# Patient Record
Sex: FEMALE | Race: WHITE | HISPANIC OR LATINO | URBAN - METROPOLITAN AREA
[De-identification: names, ages, dates, MRNs, and addresses within clinical notes are randomized per-mention and may not be internally consistent; named-entity substitution may affect disease eponyms.]

---

## 2021-01-05 ENCOUNTER — EMERGENCY (EMERGENCY)
Facility: HOSPITAL | Age: 21
LOS: 1 days | Discharge: HOME | End: 2021-01-05
Payer: COMMERCIAL

## 2021-01-05 VITALS
SYSTOLIC BLOOD PRESSURE: 134 MMHG | OXYGEN SATURATION: 100 % | RESPIRATION RATE: 18 BRPM | DIASTOLIC BLOOD PRESSURE: 90 MMHG | HEART RATE: 88 BPM | TEMPERATURE: 98 F

## 2021-01-05 PROCEDURE — U0005: CPT

## 2021-01-05 PROCEDURE — U0003: CPT

## 2021-01-05 PROCEDURE — 99283 EMERGENCY DEPT VISIT LOW MDM: CPT

## 2021-01-05 NOTE — ED PROVIDER NOTE - PATIENT PORTAL LINK FT
You can access the FollowMyHealth Patient Portal offered by [facility] by registering at the following website: [email or web address]. By joining [email or web address]’s FollowMyHealth portal, you will also be able to view your health information using other applications (apps) compatible with our system.

## 2021-01-05 NOTE — ED PROVIDER NOTE - OBJECTIVE STATEMENT
COVID ASYMPTOMATIC SWAB  Pt presenting to the ER for COVID-19 testing. Denies fevers chills, loss of taste or smell, URI symptoms, chest pain or shortness of breath, nausea vomiting diarrhea abdominal pain, weakness or fatigue. Eating and drinking normal diet. Normal output. Pt requesting testing at this time. [x] known exposure [] no-known exposure [] travel [x] no travel [ ] smoker [ x] non-smoker

## 2021-01-05 NOTE — ED PROVIDER NOTE - PHYSICAL EXAMINATION
Constitutional - well-developed; well nourished.  Head - NCAT. Airway patent.   Neuro - A&Ox3. normal gait.   Skin - No rash.   MSK - normal ROM.

## 2021-01-05 NOTE — ED PROVIDER NOTE - CLINICAL SUMMARY MEDICAL DECISION MAKING FREE TEXT BOX
Pt nontoxic appearing, stable vitals, ambulatory with stable saturation without supplemental oxygen. PT does not meet criteria listed in most updated guidelines as per [facility] protocol/algorithm for admission at this time. pt advised about self-quarantine instructions until negative test results and/or symptom resolution. pt advised on hand hygiene, monitoring of symptoms, antipyretic use as well as and fu with primary care provider. Instructions given in pre-printed copy.

## 2021-01-06 LAB — SARS-COV-2 RNA SPEC QL NAA+PROBE: SIGNIFICANT CHANGE UP

## 2021-09-02 ENCOUNTER — NON-APPOINTMENT (OUTPATIENT)
Age: 21
End: 2021-09-02

## 2021-09-02 ENCOUNTER — APPOINTMENT (OUTPATIENT)
Dept: ANTEPARTUM | Facility: CLINIC | Age: 21
End: 2021-09-02
Payer: COMMERCIAL

## 2021-09-02 ENCOUNTER — ASOB RESULT (OUTPATIENT)
Age: 21
End: 2021-09-02

## 2021-09-02 PROCEDURE — 76801 OB US < 14 WKS SINGLE FETUS: CPT

## 2021-09-02 PROCEDURE — 76813 OB US NUCHAL MEAS 1 GEST: CPT | Mod: 59

## 2021-09-02 PROCEDURE — 36416 COLLJ CAPILLARY BLOOD SPEC: CPT

## 2021-10-19 ENCOUNTER — APPOINTMENT (OUTPATIENT)
Dept: MATERNAL FETAL MEDICINE | Facility: CLINIC | Age: 21
End: 2021-10-19
Payer: COMMERCIAL

## 2021-10-19 ENCOUNTER — ASOB RESULT (OUTPATIENT)
Age: 21
End: 2021-10-19

## 2021-10-19 ENCOUNTER — APPOINTMENT (OUTPATIENT)
Dept: MATERNAL FETAL MEDICINE | Facility: CLINIC | Age: 21
End: 2021-10-19

## 2021-10-19 VITALS — WEIGHT: 292 LBS | HEIGHT: 66 IN | BODY MASS INDEX: 46.93 KG/M2

## 2021-10-19 PROCEDURE — 97802 MEDICAL NUTRITION INDIV IN: CPT

## 2021-10-28 ENCOUNTER — ASOB RESULT (OUTPATIENT)
Age: 21
End: 2021-10-28

## 2021-10-28 ENCOUNTER — APPOINTMENT (OUTPATIENT)
Dept: ANTEPARTUM | Facility: CLINIC | Age: 21
End: 2021-10-28
Payer: COMMERCIAL

## 2021-10-28 ENCOUNTER — APPOINTMENT (OUTPATIENT)
Dept: MATERNAL FETAL MEDICINE | Facility: CLINIC | Age: 21
End: 2021-10-28

## 2021-10-28 PROCEDURE — 36415 COLL VENOUS BLD VENIPUNCTURE: CPT

## 2021-10-28 PROCEDURE — 76811 OB US DETAILED SNGL FETUS: CPT

## 2021-11-01 LAB
2ND TRIMESTER DATA: NORMAL
AFP PNL SERPL: NORMAL
AFP SERPL-ACNC: NORMAL
CLINICAL BIOCHEMIST REVIEW: NORMAL
NOTES NTD: NORMAL

## 2021-11-02 ENCOUNTER — APPOINTMENT (OUTPATIENT)
Dept: MATERNAL FETAL MEDICINE | Facility: CLINIC | Age: 21
End: 2021-11-02

## 2021-11-05 ENCOUNTER — APPOINTMENT (OUTPATIENT)
Dept: ANTEPARTUM | Facility: CLINIC | Age: 21
End: 2021-11-05
Payer: COMMERCIAL

## 2021-11-05 ENCOUNTER — ASOB RESULT (OUTPATIENT)
Age: 21
End: 2021-11-05

## 2021-11-05 PROCEDURE — 76816 OB US FOLLOW-UP PER FETUS: CPT

## 2021-11-09 ENCOUNTER — APPOINTMENT (OUTPATIENT)
Dept: MATERNAL FETAL MEDICINE | Facility: CLINIC | Age: 21
End: 2021-11-09

## 2021-12-17 ENCOUNTER — APPOINTMENT (OUTPATIENT)
Dept: ANTEPARTUM | Facility: CLINIC | Age: 21
End: 2021-12-17
Payer: COMMERCIAL

## 2021-12-17 ENCOUNTER — ASOB RESULT (OUTPATIENT)
Age: 21
End: 2021-12-17

## 2021-12-17 PROCEDURE — 76816 OB US FOLLOW-UP PER FETUS: CPT

## 2022-01-28 ENCOUNTER — APPOINTMENT (OUTPATIENT)
Dept: ANTEPARTUM | Facility: CLINIC | Age: 22
End: 2022-01-28
Payer: COMMERCIAL

## 2022-01-28 ENCOUNTER — ASOB RESULT (OUTPATIENT)
Age: 22
End: 2022-01-28

## 2022-01-28 PROCEDURE — 76819 FETAL BIOPHYS PROFIL W/O NST: CPT

## 2022-01-28 PROCEDURE — 76816 OB US FOLLOW-UP PER FETUS: CPT

## 2022-02-11 ENCOUNTER — APPOINTMENT (OUTPATIENT)
Dept: ANTEPARTUM | Facility: CLINIC | Age: 22
End: 2022-02-11
Payer: COMMERCIAL

## 2022-02-11 ENCOUNTER — ASOB RESULT (OUTPATIENT)
Age: 22
End: 2022-02-11

## 2022-02-11 PROCEDURE — ZZZZZ: CPT

## 2022-02-11 PROCEDURE — 76818 FETAL BIOPHYS PROFILE W/NST: CPT

## 2022-02-18 ENCOUNTER — ASOB RESULT (OUTPATIENT)
Age: 22
End: 2022-02-18

## 2022-02-18 ENCOUNTER — APPOINTMENT (OUTPATIENT)
Dept: ANTEPARTUM | Facility: CLINIC | Age: 22
End: 2022-02-18
Payer: COMMERCIAL

## 2022-02-18 PROCEDURE — 76818 FETAL BIOPHYS PROFILE W/NST: CPT

## 2022-02-18 PROCEDURE — ZZZZZ: CPT

## 2022-02-25 ENCOUNTER — APPOINTMENT (OUTPATIENT)
Dept: ANTEPARTUM | Facility: CLINIC | Age: 22
End: 2022-02-25
Payer: COMMERCIAL

## 2022-02-25 ENCOUNTER — ASOB RESULT (OUTPATIENT)
Age: 22
End: 2022-02-25

## 2022-02-25 PROCEDURE — ZZZZZ: CPT

## 2022-02-25 PROCEDURE — 76816 OB US FOLLOW-UP PER FETUS: CPT

## 2022-02-25 PROCEDURE — 76818 FETAL BIOPHYS PROFILE W/NST: CPT

## 2022-03-04 ENCOUNTER — APPOINTMENT (OUTPATIENT)
Dept: ANTEPARTUM | Facility: CLINIC | Age: 22
End: 2022-03-04
Payer: COMMERCIAL

## 2022-03-04 ENCOUNTER — ASOB RESULT (OUTPATIENT)
Age: 22
End: 2022-03-04

## 2022-03-04 PROCEDURE — 76818 FETAL BIOPHYS PROFILE W/NST: CPT

## 2022-03-04 PROCEDURE — ZZZZZ: CPT

## 2022-03-07 ENCOUNTER — TRANSCRIPTION ENCOUNTER (OUTPATIENT)
Age: 22
End: 2022-03-07

## 2022-03-07 ENCOUNTER — OUTPATIENT (OUTPATIENT)
Dept: INPATIENT UNIT | Facility: HOSPITAL | Age: 22
LOS: 1 days | End: 2022-03-07
Payer: COMMERCIAL

## 2022-03-07 ENCOUNTER — OUTPATIENT (OUTPATIENT)
Dept: OUTPATIENT SERVICES | Facility: HOSPITAL | Age: 22
LOS: 1 days | End: 2022-03-07

## 2022-03-07 VITALS
RESPIRATION RATE: 16 BRPM | SYSTOLIC BLOOD PRESSURE: 118 MMHG | DIASTOLIC BLOOD PRESSURE: 60 MMHG | TEMPERATURE: 98 F | HEART RATE: 88 BPM | HEIGHT: 67 IN | WEIGHT: 293 LBS

## 2022-03-07 VITALS
RESPIRATION RATE: 16 BRPM | TEMPERATURE: 98 F | SYSTOLIC BLOOD PRESSURE: 118 MMHG | DIASTOLIC BLOOD PRESSURE: 60 MMHG | HEART RATE: 88 BPM

## 2022-03-07 DIAGNOSIS — O47.1 FALSE LABOR AT OR AFTER 37 COMPLETED WEEKS OF GESTATION: ICD-10-CM

## 2022-03-07 DIAGNOSIS — Z01.818 ENCOUNTER FOR OTHER PREPROCEDURAL EXAMINATION: ICD-10-CM

## 2022-03-07 LAB
ALBUMIN SERPL ELPH-MCNC: 3.5 G/DL — SIGNIFICANT CHANGE UP (ref 3.3–5.2)
ALP SERPL-CCNC: 161 U/L — HIGH (ref 40–120)
ALT FLD-CCNC: 12 U/L — SIGNIFICANT CHANGE UP
ANION GAP SERPL CALC-SCNC: 12 MMOL/L — SIGNIFICANT CHANGE UP (ref 5–17)
APPEARANCE UR: ABNORMAL
AST SERPL-CCNC: 17 U/L — SIGNIFICANT CHANGE UP
BILIRUB SERPL-MCNC: 0.2 MG/DL — LOW (ref 0.4–2)
BILIRUB UR-MCNC: NEGATIVE — SIGNIFICANT CHANGE UP
BLD GP AB SCN SERPL QL: SIGNIFICANT CHANGE UP
BUN SERPL-MCNC: 8.7 MG/DL — SIGNIFICANT CHANGE UP (ref 8–20)
CALCIUM SERPL-MCNC: 9.5 MG/DL — SIGNIFICANT CHANGE UP (ref 8.6–10.2)
CHLORIDE SERPL-SCNC: 104 MMOL/L — SIGNIFICANT CHANGE UP (ref 98–107)
CO2 SERPL-SCNC: 22 MMOL/L — SIGNIFICANT CHANGE UP (ref 22–29)
COLOR SPEC: YELLOW — SIGNIFICANT CHANGE UP
CREAT SERPL-MCNC: 0.49 MG/DL — LOW (ref 0.5–1.3)
DIFF PNL FLD: NEGATIVE — SIGNIFICANT CHANGE UP
EGFR: 136 ML/MIN/1.73M2 — SIGNIFICANT CHANGE UP
EPI CELLS # UR: ABNORMAL
GLUCOSE SERPL-MCNC: 92 MG/DL — SIGNIFICANT CHANGE UP (ref 70–99)
GLUCOSE UR QL: NEGATIVE MG/DL — SIGNIFICANT CHANGE UP
HCT VFR BLD CALC: 38.6 % — SIGNIFICANT CHANGE UP (ref 34.5–45)
HGB BLD-MCNC: 12.9 G/DL — SIGNIFICANT CHANGE UP (ref 11.5–15.5)
HIV 1 & 2 AB SERPL IA.RAPID: SIGNIFICANT CHANGE UP
KETONES UR-MCNC: ABNORMAL
LEUKOCYTE ESTERASE UR-ACNC: ABNORMAL
MCHC RBC-ENTMCNC: 28.9 PG — SIGNIFICANT CHANGE UP (ref 27–34)
MCHC RBC-ENTMCNC: 33.4 GM/DL — SIGNIFICANT CHANGE UP (ref 32–36)
MCV RBC AUTO: 86.4 FL — SIGNIFICANT CHANGE UP (ref 80–100)
NITRITE UR-MCNC: NEGATIVE — SIGNIFICANT CHANGE UP
PH UR: 6 — SIGNIFICANT CHANGE UP (ref 5–8)
PLATELET # BLD AUTO: 280 K/UL — SIGNIFICANT CHANGE UP (ref 150–400)
POTASSIUM SERPL-MCNC: 4.3 MMOL/L — SIGNIFICANT CHANGE UP (ref 3.5–5.3)
POTASSIUM SERPL-SCNC: 4.3 MMOL/L — SIGNIFICANT CHANGE UP (ref 3.5–5.3)
PROT SERPL-MCNC: 7.6 G/DL — SIGNIFICANT CHANGE UP (ref 6.6–8.7)
PROT UR-MCNC: 15
RBC # BLD: 4.47 M/UL — SIGNIFICANT CHANGE UP (ref 3.8–5.2)
RBC # FLD: 14.1 % — SIGNIFICANT CHANGE UP (ref 10.3–14.5)
RBC CASTS # UR COMP ASSIST: NEGATIVE /HPF — SIGNIFICANT CHANGE UP (ref 0–4)
SODIUM SERPL-SCNC: 138 MMOL/L — SIGNIFICANT CHANGE UP (ref 135–145)
SP GR SPEC: 1.02 — SIGNIFICANT CHANGE UP (ref 1.01–1.02)
UROBILINOGEN FLD QL: 1 MG/DL
WBC # BLD: 12.78 K/UL — HIGH (ref 3.8–10.5)
WBC # FLD AUTO: 12.78 K/UL — HIGH (ref 3.8–10.5)
WBC UR QL: SIGNIFICANT CHANGE UP /HPF (ref 0–5)

## 2022-03-07 PROCEDURE — 81001 URINALYSIS AUTO W/SCOPE: CPT

## 2022-03-07 PROCEDURE — 85027 COMPLETE CBC AUTOMATED: CPT

## 2022-03-07 PROCEDURE — 86703 HIV-1/HIV-2 1 RESULT ANTBDY: CPT

## 2022-03-07 PROCEDURE — 86900 BLOOD TYPING SEROLOGIC ABO: CPT

## 2022-03-07 PROCEDURE — 80053 COMPREHEN METABOLIC PANEL: CPT

## 2022-03-07 PROCEDURE — 86850 RBC ANTIBODY SCREEN: CPT

## 2022-03-07 PROCEDURE — 87389 HIV-1 AG W/HIV-1&-2 AB AG IA: CPT

## 2022-03-07 PROCEDURE — 36415 COLL VENOUS BLD VENIPUNCTURE: CPT

## 2022-03-07 PROCEDURE — 86901 BLOOD TYPING SEROLOGIC RH(D): CPT

## 2022-03-07 NOTE — OB PROVIDER TRIAGE NOTE - NSOBPROVIDERNOTE_OBGYN_ALL_OB_FT
A/P: Patient is a 22yo  at 39w6d here for presurgical labs for pCS tomorrow for active herpes lesion  - Vital signs wnl  - Tracing reactive with no signs of labor  - Preoperative blood work drawn  - COVID swab not done as pt COVID+ in January  - Okay for d/c home, return in AM for CS    Plan D/w Dr. Ward

## 2022-03-07 NOTE — OB RN TRIAGE NOTE - NS_TRIAGEPROVIDERNOTIFIEDBY_OBGYN_ALL_OB_FT
Patient would like all of his prescriptions sent to [facility] [address], [address], [place] [address]   Sara RN

## 2022-03-07 NOTE — OB PROVIDER TRIAGE NOTE - NSHPPHYSICALEXAM_GEN_ALL_CORE
Vital Signs Last 24 Hrs  T(C): 36.9 (07 Mar 2022 20:28), Max: 36.9 (07 Mar 2022 20:28)  T(F): 98.4 (07 Mar 2022 20:28), Max: 98.4 (07 Mar 2022 20:28)  HR: 88 (07 Mar 2022 20:28) (88 - 88)  BP: 118/60 (07 Mar 2022 20:28) (118/60 - 118/60)  BP(mean): --  RR: 16 (07 Mar 2022 20:28) (16 - 16)  SpO2: --    Physical Exam  General: Alert and oriented x3, NAD  Heart: RRR  Lungs: CTAB  Abd: Soft, nontender, gravid  FHT: 135bpm - reactive  [place]: No ctxs

## 2022-03-07 NOTE — OB RN TRIAGE NOTE - NSICDXPASTMEDICALHX_GEN_ALL_CORE_FT
PAST MEDICAL HISTORY:  COVID-19 (Covid positive 1/2/2022)    HSV-2 (herpes simplex virus 2) infection

## 2022-03-07 NOTE — OB RN TRIAGE NOTE - FALL HARM RISK - UNIVERSAL INTERVENTIONS
Bed in lowest position, wheels locked, appropriate side rails in place/Call bell, personal items and telephone in reach/Instruct patient to call for assistance before getting out of bed or chair/Non-slip footwear when patient is out of bed/[place] to call system/Physically safe environment - no spills, clutter or unnecessary equipment/Purposeful Proactive Rounding/Room/bathroom lighting operational, light cord in reach

## 2022-03-07 NOTE — OB PROVIDER TRIAGE NOTE - HISTORY OF PRESENT ILLNESS
Patient is a 20yo  at 39w6d presenting for PST for pCS for active herpes lesion tomorrow.  She denies any OB sxs today, no VB, LOF, CTXs. +FM    Prenatal course c/b HSV2    Obhx: none  Medhx: HSV2, obesity (BMI 47)  Surghx: none  Meds: Valtrex  All: NKDA

## 2022-03-08 ENCOUNTER — TRANSCRIPTION ENCOUNTER (OUTPATIENT)
Age: 22
End: 2022-03-08

## 2022-03-08 ENCOUNTER — RESULT REVIEW (OUTPATIENT)
Age: 22
End: 2022-03-08

## 2022-03-08 ENCOUNTER — INPATIENT (INPATIENT)
Facility: HOSPITAL | Age: 22
LOS: 2 days | Discharge: HOME | End: 2022-03-11
Attending: OBSTETRICS & GYNECOLOGY | Admitting: OBSTETRICS & GYNECOLOGY
Payer: COMMERCIAL

## 2022-03-08 VITALS
RESPIRATION RATE: 18 BRPM | WEIGHT: 293 LBS | HEIGHT: 67 IN | TEMPERATURE: 98 F | SYSTOLIC BLOOD PRESSURE: 142 MMHG | HEART RATE: 103 BPM | DIASTOLIC BLOOD PRESSURE: 82 MMHG

## 2022-03-08 LAB
BLD GP AB SCN SERPL QL: SIGNIFICANT CHANGE UP
COVID-19 SPIKE DOMAIN AB INTERP: POSITIVE
COVID-19 SPIKE DOMAIN ANTIBODY RESULT: >250 U/ML — HIGH
HIV 1+2 AB+HIV1 P24 AG SERPL QL IA: SIGNIFICANT CHANGE UP
SARS-COV-2 IGG+IGM SERPL QL IA: >250 U/ML — HIGH
SARS-COV-2 IGG+IGM SERPL QL IA: POSITIVE

## 2022-03-08 PROCEDURE — 88307 TISSUE EXAM BY PATHOLOGIST: CPT | Mod: 26

## 2022-03-08 RX ORDER — CEFAZOLIN SODIUM 1 G
300 VIAL (EA) INJECTION EVERY 8 HOURS
Refills: 0 | Status: DISCONTINUED | OUTPATIENT
Start: 2022-03-08 | End: 2022-03-08

## 2022-03-08 RX ORDER — TETANUS TOXOID, REDUCED DIPHTHERIA TOXOID AND ACELLULAR PERTUSSIS VACCINE, ADSORBED 5; 2.5; 8; 8; 2.5 [IU]/.5ML; [IU]/.5ML; UG/.5ML; UG/.5ML; UG/.5ML
0.5 SUSPENSION INTRAMUSCULAR ONCE
Refills: 0 | Status: DISCONTINUED | OUTPATIENT
Start: 2022-03-08 | End: 2022-03-11

## 2022-03-08 RX ORDER — DIPHENHYDRAMINE HCL 50 MG
25 CAPSULE ORAL EVERY 6 HOURS
Refills: 0 | Status: DISCONTINUED | OUTPATIENT
Start: 2022-03-08 | End: 2022-03-11

## 2022-03-08 RX ORDER — CITRIC ACID/SODIUM CITRATE 300-500 MG
30 SOLUTION, ORAL ORAL ONCE
Refills: 0 | Status: DISCONTINUED | OUTPATIENT
Start: 2022-03-08 | End: 2022-03-08

## 2022-03-08 RX ORDER — DIPHENHYDRAMINE HCL 50 MG
25 CAPSULE ORAL EVERY 4 HOURS
Refills: 0 | Status: DISCONTINUED | OUTPATIENT
Start: 2022-03-08 | End: 2022-03-11

## 2022-03-08 RX ORDER — OXYCODONE HYDROCHLORIDE 5 MG/1
5 TABLET ORAL
Refills: 0 | Status: COMPLETED | OUTPATIENT
Start: 2022-03-08 | End: 2022-03-15

## 2022-03-08 RX ORDER — INFLUENZA VIRUS VACCINE 15; 15; 15; 15 UG/.5ML; UG/.5ML; UG/.5ML; UG/.5ML
0.5 SUSPENSION INTRAMUSCULAR ONCE
Refills: 0 | Status: COMPLETED | OUTPATIENT
Start: 2022-03-08 | End: 2022-03-08

## 2022-03-08 RX ORDER — CEFAZOLIN SODIUM 1 G
2000 VIAL (EA) INJECTION ONCE
Refills: 0 | Status: DISCONTINUED | OUTPATIENT
Start: 2022-03-08 | End: 2022-03-08

## 2022-03-08 RX ORDER — ENOXAPARIN SODIUM 100 MG/ML
80 INJECTION SUBCUTANEOUS EVERY 24 HOURS
Refills: 0 | Status: DISCONTINUED | OUTPATIENT
Start: 2022-03-08 | End: 2022-03-11

## 2022-03-08 RX ORDER — SODIUM CHLORIDE 9 MG/ML
1000 INJECTION, SOLUTION INTRAVENOUS ONCE
Refills: 0 | Status: COMPLETED | OUTPATIENT
Start: 2022-03-08 | End: 2022-03-08

## 2022-03-08 RX ORDER — MAGNESIUM HYDROXIDE 400 MG/1
30 TABLET, CHEWABLE ORAL
Refills: 0 | Status: DISCONTINUED | OUTPATIENT
Start: 2022-03-08 | End: 2022-03-11

## 2022-03-08 RX ORDER — CEFAZOLIN SODIUM 1 G
3000 VIAL (EA) INJECTION ONCE
Refills: 0 | Status: COMPLETED | OUTPATIENT
Start: 2022-03-08 | End: 2022-03-08

## 2022-03-08 RX ORDER — ONDANSETRON 8 MG/1
4 TABLET, FILM COATED ORAL EVERY 6 HOURS
Refills: 0 | Status: DISCONTINUED | OUTPATIENT
Start: 2022-03-08 | End: 2022-03-11

## 2022-03-08 RX ORDER — IBUPROFEN 200 MG
600 TABLET ORAL EVERY 6 HOURS
Refills: 0 | Status: COMPLETED | OUTPATIENT
Start: 2022-03-08 | End: 2023-02-04

## 2022-03-08 RX ORDER — FAMOTIDINE 10 MG/ML
20 INJECTION INTRAVENOUS ONCE
Refills: 0 | Status: COMPLETED | OUTPATIENT
Start: 2022-03-08 | End: 2022-03-08

## 2022-03-08 RX ORDER — ACETAMINOPHEN 500 MG
975 TABLET ORAL
Refills: 0 | Status: DISCONTINUED | OUTPATIENT
Start: 2022-03-08 | End: 2022-03-11

## 2022-03-08 RX ORDER — NALOXONE HYDROCHLORIDE 4 MG/.1ML
0.1 SPRAY NASAL
Refills: 0 | Status: DISCONTINUED | OUTPATIENT
Start: 2022-03-08 | End: 2022-03-11

## 2022-03-08 RX ORDER — SODIUM CHLORIDE 9 MG/ML
1000 INJECTION, SOLUTION INTRAVENOUS
Refills: 0 | Status: DISCONTINUED | OUTPATIENT
Start: 2022-03-08 | End: 2022-03-11

## 2022-03-08 RX ORDER — SODIUM CHLORIDE 9 MG/ML
1000 INJECTION, SOLUTION INTRAVENOUS
Refills: 0 | Status: DISCONTINUED | OUTPATIENT
Start: 2022-03-08 | End: 2022-03-08

## 2022-03-08 RX ORDER — LANOLIN
1 OINTMENT (GRAM) TOPICAL EVERY 6 HOURS
Refills: 0 | Status: DISCONTINUED | OUTPATIENT
Start: 2022-03-08 | End: 2022-03-11

## 2022-03-08 RX ORDER — OXYTOCIN 10 UNIT/ML
333.33 VIAL (ML) INJECTION
Qty: 20 | Refills: 0 | Status: DISCONTINUED | OUTPATIENT
Start: 2022-03-08 | End: 2022-03-11

## 2022-03-08 RX ORDER — ACETAMINOPHEN 500 MG
1000 TABLET ORAL ONCE
Refills: 0 | Status: COMPLETED | OUTPATIENT
Start: 2022-03-08 | End: 2022-03-08

## 2022-03-08 RX ORDER — KETOROLAC TROMETHAMINE 30 MG/ML
30 SYRINGE (ML) INJECTION EVERY 6 HOURS
Refills: 0 | Status: DISCONTINUED | OUTPATIENT
Start: 2022-03-08 | End: 2022-03-09

## 2022-03-08 RX ORDER — SIMETHICONE 80 MG/1
80 TABLET, CHEWABLE ORAL EVERY 4 HOURS
Refills: 0 | Status: DISCONTINUED | OUTPATIENT
Start: 2022-03-08 | End: 2022-03-11

## 2022-03-08 RX ORDER — CEFAZOLIN SODIUM 1 G
3000 VIAL (EA) INJECTION EVERY 8 HOURS
Refills: 0 | Status: COMPLETED | OUTPATIENT
Start: 2022-03-08 | End: 2022-03-09

## 2022-03-08 RX ORDER — OXYCODONE HYDROCHLORIDE 5 MG/1
5 TABLET ORAL ONCE
Refills: 0 | Status: DISCONTINUED | OUTPATIENT
Start: 2022-03-08 | End: 2022-03-11

## 2022-03-08 RX ADMIN — Medication 100 MILLIGRAM(S): at 17:42

## 2022-03-08 RX ADMIN — FAMOTIDINE 20 MILLIGRAM(S): 10 INJECTION INTRAVENOUS at 09:27

## 2022-03-08 RX ADMIN — Medication 400 MILLIGRAM(S): at 13:43

## 2022-03-08 RX ADMIN — Medication 200 MILLIGRAM(S): at 10:57

## 2022-03-08 RX ADMIN — Medication 30 MILLIGRAM(S): at 23:18

## 2022-03-08 RX ADMIN — SODIUM CHLORIDE 125 MILLILITER(S): 9 INJECTION, SOLUTION INTRAVENOUS at 09:45

## 2022-03-08 RX ADMIN — ENOXAPARIN SODIUM 80 MILLIGRAM(S): 100 INJECTION SUBCUTANEOUS at 23:48

## 2022-03-08 RX ADMIN — Medication 30 MILLIGRAM(S): at 17:42

## 2022-03-08 RX ADMIN — Medication 1000 MILLIGRAM(S): at 14:14

## 2022-03-08 RX ADMIN — Medication 1000 MILLIUNIT(S)/MIN: at 10:58

## 2022-03-08 RX ADMIN — SODIUM CHLORIDE 2000 MILLILITER(S): 9 INJECTION, SOLUTION INTRAVENOUS at 09:00

## 2022-03-08 RX ADMIN — Medication 975 MILLIGRAM(S): at 20:12

## 2022-03-08 NOTE — OB RN PATIENT PROFILE - FUNCTIONAL ASSESSMENT - BASIC MOBILITY SCORE.
Pt presents with mid abdominal pain, RUQ pain since Sunday, vomiting since 1:30 am, pt assessed by Dr Huber, pt agrees to ER transfer, report given to RN, pt taken to ER via wheelchair.    24

## 2022-03-08 NOTE — DISCHARGE NOTE OB - MEDICATION SUMMARY - MEDICATIONS TO TAKE
I will START or STAY ON the medications listed below when I get home from the hospital:    acetaminophen 325 mg oral capsule  -- 3 cap(s) by mouth every 6 hours   -- Indication: For pain    ibuprofen 600 mg oral tablet  -- 1 tab(s) by mouth every 6 hours   -- Do not take this drug if you are pregnant.  It is very important that you take or use this exactly as directed.  Do not skip doses or discontinue unless directed by your doctor.  May cause drowsiness or dizziness.  Obtain medical advice before taking any non-prescription drugs as some may affect the action of this medication.  Take with food or milk.    -- Indication: For pain    Lovenox 60 mg/0.6 mL injectable solution  -- 60 milligram(s) subcutaneously once a day   -- It is very important that you take or use this exactly as directed.  Do not skip doses or discontinue unless directed by your doctor.    -- Indication: For VTX ppx

## 2022-03-08 NOTE — OB PROVIDER H&P - ASSESSMENT
Patient is a 20 year old  at 39w6d who presents to L&D for pCS 2/2 active genital herpetic lesion. Patient is a 20 year old  at 39w6d who presents to L&D for pCS 2/2 active genital herpetic lesion.    A/P:   -Admit to L&D  -Consent  -Admission labs  -3g ancef preop  -IV fluids  -Fetus: Cat 1 tracing. Continuous toco and fetal monitoring.   -GBS: no GBS ppx required   -Analgesia: spinal    Discussed with Dr. Ward

## 2022-03-08 NOTE — OB PROVIDER H&P - HISTORY OF PRESENT ILLNESS
Patient is a 20 year old  at 39w6d who presents to L&D for pCS 2/2 active genital herpetic lesion.    NEY: 3/8/22   LMP: 21     Pregnancy course:   1. HSV2, on valtrex   2. Covid +, asymptomatic      Obhx: denies   Gynhx: HSV2+, denies hx of ovarian cysts, fibroids, abnormal paps   Pmhx: denies   Pshx: denies   Meds: PNV, valtrex   Allergies: NKDA   Social Hx: denies EtOH, tobacco and illicit drug use in pregnancy  Patient is a 20 year old  at 39w6d who presents to L&D for pCS 2/2 active genital herpetic lesion. - ctx, - vaginal bleeding, - loss of fluid. + fetal movement. No complaints.     NEY: 3/8/22   LMP: 21     Pregnancy course:   1. HSV2, on valtrex   2. Covid +, asymptomatic      Obhx: denies   Gynhx: HSV2+, denies hx of ovarian cysts, fibroids, abnormal paps   Pmhx: denies   Pshx: denies   Meds: PNV, valtrex   Allergies: NKDA   Social Hx: denies EtOH, tobacco and illicit drug use in pregnancy

## 2022-03-08 NOTE — DISCHARGE NOTE OB - PATIENT PORTAL LINK FT
You can access the FollowMyHealth Patient Portal offered by [facility] by registering at the following website: [email or web address]. By joining [employer]’s FollowMyHealth portal, you will also be able to view your health information using other applications (apps) compatible with our system.

## 2022-03-08 NOTE — DISCHARGE NOTE OB - CARE PLAN
1 Principal Discharge DX:	Delivery normal  Assessment and plan of treatment:	1) Please take ibuprofen and/or Tylenol as needed for pain as prescribed.  2) Nothing in the vagina for 6 weeks (including no sex, no tampons, and no douching).  3) Please call your doctor for a follow up your postpartum appointment in 1 week.  4) Please continue taking vitamins postpartum. Take iron and colace for acute blood loss anemia.  5) Please call the office sooner if you have heavy vaginal bleeding, severe abdominal pain, or fever > 100.4F.  6) You may resume regular daily activity as tolerated

## 2022-03-08 NOTE — OB RN DELIVERY SUMMARY - NS_SEPSISRSKCALC_OBGYN_ALL_OB_FT
No temperature has been documented for this patient in CPN or on the OB Flowsheet. Ensure the highest temperature during labor was documented on the OB Flowsheet.  No gestational age at birth has been documented. Ensure delivery date/time has been entered above.  Rupture of membranes must be entered above.   EOS calculated successfully. EOS Risk Factor: 0.5/1000 live births ([facility] national incidence); GA=40w;Temp=98.06; ROM=0.05; GBS='Positive'; Antibiotics='No antibiotics or any antibiotics < 2 hrs prior to birth'

## 2022-03-08 NOTE — OB RN TRIAGE NOTE - FALL HARM RISK - UNIVERSAL INTERVENTIONS
Bed in lowest position, wheels locked, appropriate side rails in place/Call bell, personal items and telephone in reach/Instruct patient to call for assistance before getting out of bed or chair/Non-slip footwear when patient is out of bed/[place] to call system/Physically safe environment - no spills, clutter or unnecessary equipment/Purposeful Proactive Rounding/Room/bathroom lighting operational, light cord in reach

## 2022-03-08 NOTE — OB PROVIDER H&P - NSHPPHYSICALEXAM_GEN_ALL_CORE
Vitals:  Vital Signs Last 24 Hrs  T(C): 36.7 (08 Mar 2022 09:14), Max: 36.9 (07 Mar 2022 20:28)  T(F): 98.06 (08 Mar 2022 09:14), Max: 98.4 (07 Mar 2022 20:28)  HR: 85 (08 Mar 2022 09:52) (85 - 103)  BP: 135/68 (08 Mar 2022 09:52) (118/60 - 142/82)  RR: 18 (08 Mar 2022 08:55) (16 - 18)    Gen: well-appearing, NAD   Resp: breathing comfortably on RA   Abd: nontender, gravid   VE: deferred     Bedside sono: cephalic  FHT: reactive  [place]: no reg ctx

## 2022-03-08 NOTE — OB NEONATOLOGY/PEDIATRICIAN DELIVERY SUMMARY - NSPEDSNEONOTESA_OBGYN_ALL_OB_FT
Called to attend this vacuum assisted primary, scheduled  at 40 weeks. Mother is a 22yo  with hx of HSV2 on valtrex, prenatal labs negative, GBS + (no labor or rupture), blood type O+. Baby emerged with good tone, cry with stimulations, and copious secretions. She was warmed, dried and stimulated and developed grunting respirations for which she received CPAP 5 21% for 5 minutes. She continued to have deep retractions and grunting and decision was made to admit to NICU for respiratory distress.

## 2022-03-08 NOTE — DISCHARGE NOTE OB - CARE PROVIDER_API CALL
Kim Ward)  Obstetrics and Gynecology  [address] [place] [address]  Phone: [phone]  Fax: [phone]  Follow Up Time:

## 2022-03-08 NOTE — DISCHARGE NOTE OB - NS MD DC FALL RISK RISK
For information on Fall & Injury Prevention, visit: [email or web address] OR  [email or web address] OR  https://www.cdc.gov/steadi/patient.html

## 2022-03-08 NOTE — OB PROVIDER DELIVERY SUMMARY - NSPROVIDERDELIVERYNOTE_OBGYN_ALL_OB_FT
Pt taken to the OR for [facility] primary C/S due to active HSV.  Not in labor.  Spinal anesthesia.  Low transverse  section was performed.  There was difficulty delivering the fetal head. Additional assistance required and provided. Vacuum was applied to fetal head.  Delivered live female infant, vtx, through moderate amount of clear amniotic fluid.  No nuchal cord.  Uterus, tubes, ovaries WNL.   Hysterotomy was reapproximated with suture, excellent hemostasis obtained.  Rectus muscle and fascia were reapproximated with suture, excellent hemostasis obtained.  skin closed in a subcuticular fashion.    Skin-to-skin initiated in OR and continued into RR.  APGAR 9/9.   QBL: 784  Uop: 250  IVF: 1600

## 2022-03-08 NOTE — DISCHARGE NOTE OB - MEDICATION SUMMARY - MEDICATIONS TO STOP TAKING
Surgeon/Pathologist Verbiage (Will Incorporate Name Of Surgeon From Intro If Not Blank): operated in two distinct and integrated capacities as the surgeon and pathologist. I will STOP taking the medications listed below when I get home from the hospital:  None

## 2022-03-09 LAB
BASOPHILS # BLD AUTO: 0.03 K/UL — SIGNIFICANT CHANGE UP (ref 0–0.2)
BASOPHILS NFR BLD AUTO: 0.2 % — SIGNIFICANT CHANGE UP (ref 0–2)
EOSINOPHIL # BLD AUTO: 0.08 K/UL — SIGNIFICANT CHANGE UP (ref 0–0.5)
EOSINOPHIL NFR BLD AUTO: 0.6 % — SIGNIFICANT CHANGE UP (ref 0–6)
HCT VFR BLD CALC: 34.7 % — SIGNIFICANT CHANGE UP (ref 34.5–45)
HGB BLD-MCNC: 11.2 G/DL — LOW (ref 11.5–15.5)
IMM GRANULOCYTES NFR BLD AUTO: 1.1 % — SIGNIFICANT CHANGE UP (ref 0–1.5)
LYMPHOCYTES # BLD AUTO: 16.8 % — SIGNIFICANT CHANGE UP (ref 13–44)
LYMPHOCYTES # BLD AUTO: 2.35 K/UL — SIGNIFICANT CHANGE UP (ref 1–3.3)
MCHC RBC-ENTMCNC: 28.6 PG — SIGNIFICANT CHANGE UP (ref 27–34)
MCHC RBC-ENTMCNC: 32.3 GM/DL — SIGNIFICANT CHANGE UP (ref 32–36)
MCV RBC AUTO: 88.7 FL — SIGNIFICANT CHANGE UP (ref 80–100)
MONOCYTES # BLD AUTO: 1 K/UL — HIGH (ref 0–0.9)
MONOCYTES NFR BLD AUTO: 7.2 % — SIGNIFICANT CHANGE UP (ref 2–14)
NEUTROPHILS # BLD AUTO: 10.33 K/UL — HIGH (ref 1.8–7.4)
NEUTROPHILS NFR BLD AUTO: 74.1 % — SIGNIFICANT CHANGE UP (ref 43–77)
PLATELET # BLD AUTO: 242 K/UL — SIGNIFICANT CHANGE UP (ref 150–400)
RBC # BLD: 3.91 M/UL — SIGNIFICANT CHANGE UP (ref 3.8–5.2)
RBC # FLD: 14.4 % — SIGNIFICANT CHANGE UP (ref 10.3–14.5)
T PALLIDUM AB TITR SER: NEGATIVE — SIGNIFICANT CHANGE UP
WBC # BLD: 13.95 K/UL — HIGH (ref 3.8–10.5)
WBC # FLD AUTO: 13.95 K/UL — HIGH (ref 3.8–10.5)

## 2022-03-09 RX ORDER — ACETAMINOPHEN 500 MG
3 TABLET ORAL
Qty: 36 | Refills: 0
Start: 2022-03-09 | End: 2022-03-11

## 2022-03-09 RX ORDER — ENOXAPARIN SODIUM 100 MG/ML
60 INJECTION SUBCUTANEOUS
Qty: 8.4 | Refills: 0
Start: 2022-03-09 | End: 2022-03-22

## 2022-03-09 RX ORDER — IBUPROFEN 200 MG
600 TABLET ORAL EVERY 6 HOURS
Refills: 0 | Status: DISCONTINUED | OUTPATIENT
Start: 2022-03-09 | End: 2022-03-11

## 2022-03-09 RX ORDER — OXYCODONE HYDROCHLORIDE 5 MG/1
5 TABLET ORAL
Refills: 0 | Status: DISCONTINUED | OUTPATIENT
Start: 2022-03-09 | End: 2022-03-11

## 2022-03-09 RX ORDER — IBUPROFEN 200 MG
1 TABLET ORAL
Qty: 28 | Refills: 0
Start: 2022-03-09 | End: 2022-03-15

## 2022-03-09 RX ADMIN — Medication 975 MILLIGRAM(S): at 21:30

## 2022-03-09 RX ADMIN — SIMETHICONE 80 MILLIGRAM(S): 80 TABLET, CHEWABLE ORAL at 16:35

## 2022-03-09 RX ADMIN — Medication 975 MILLIGRAM(S): at 02:08

## 2022-03-09 RX ADMIN — Medication 30 MILLIGRAM(S): at 11:53

## 2022-03-09 RX ADMIN — Medication 600 MILLIGRAM(S): at 23:21

## 2022-03-09 RX ADMIN — Medication 975 MILLIGRAM(S): at 20:44

## 2022-03-09 RX ADMIN — OXYCODONE HYDROCHLORIDE 5 MILLIGRAM(S): 5 TABLET ORAL at 16:34

## 2022-03-09 RX ADMIN — Medication 975 MILLIGRAM(S): at 08:37

## 2022-03-09 RX ADMIN — Medication 1 TABLET(S): at 11:52

## 2022-03-09 RX ADMIN — Medication 30 MILLIGRAM(S): at 05:10

## 2022-03-09 RX ADMIN — Medication 100 MILLIGRAM(S): at 10:29

## 2022-03-09 RX ADMIN — Medication 100 MILLIGRAM(S): at 02:10

## 2022-03-09 RX ADMIN — Medication 975 MILLIGRAM(S): at 16:34

## 2022-03-09 RX ADMIN — Medication 600 MILLIGRAM(S): at 17:34

## 2022-03-09 RX ADMIN — ENOXAPARIN SODIUM 80 MILLIGRAM(S): 100 INJECTION SUBCUTANEOUS at 23:22

## 2022-03-09 RX ADMIN — OXYCODONE HYDROCHLORIDE 5 MILLIGRAM(S): 5 TABLET ORAL at 17:30

## 2022-03-10 LAB
HCT VFR BLD CALC: 33.3 % — LOW (ref 34.5–45)
HGB BLD-MCNC: 10.7 G/DL — LOW (ref 11.5–15.5)
MCHC RBC-ENTMCNC: 28.8 PG — SIGNIFICANT CHANGE UP (ref 27–34)
MCHC RBC-ENTMCNC: 32.1 GM/DL — SIGNIFICANT CHANGE UP (ref 32–36)
MCV RBC AUTO: 89.5 FL — SIGNIFICANT CHANGE UP (ref 80–100)
PLATELET # BLD AUTO: 234 K/UL — SIGNIFICANT CHANGE UP (ref 150–400)
RBC # BLD: 3.72 M/UL — LOW (ref 3.8–5.2)
RBC # FLD: 14.5 % — SIGNIFICANT CHANGE UP (ref 10.3–14.5)
WBC # BLD: 10.38 K/UL — SIGNIFICANT CHANGE UP (ref 3.8–10.5)
WBC # FLD AUTO: 10.38 K/UL — SIGNIFICANT CHANGE UP (ref 3.8–10.5)

## 2022-03-10 RX ADMIN — Medication 600 MILLIGRAM(S): at 06:30

## 2022-03-10 RX ADMIN — Medication 975 MILLIGRAM(S): at 10:00

## 2022-03-10 RX ADMIN — SIMETHICONE 80 MILLIGRAM(S): 80 TABLET, CHEWABLE ORAL at 03:47

## 2022-03-10 RX ADMIN — Medication 600 MILLIGRAM(S): at 23:50

## 2022-03-10 RX ADMIN — ENOXAPARIN SODIUM 80 MILLIGRAM(S): 100 INJECTION SUBCUTANEOUS at 23:51

## 2022-03-10 RX ADMIN — Medication 600 MILLIGRAM(S): at 12:05

## 2022-03-10 RX ADMIN — Medication 975 MILLIGRAM(S): at 21:33

## 2022-03-10 RX ADMIN — Medication 600 MILLIGRAM(S): at 18:07

## 2022-03-10 RX ADMIN — Medication 975 MILLIGRAM(S): at 03:48

## 2022-03-10 RX ADMIN — Medication 600 MILLIGRAM(S): at 05:44

## 2022-03-10 RX ADMIN — Medication 975 MILLIGRAM(S): at 15:22

## 2022-03-10 RX ADMIN — Medication 1 TABLET(S): at 12:05

## 2022-03-11 ENCOUNTER — APPOINTMENT (OUTPATIENT)
Dept: ANTEPARTUM | Facility: CLINIC | Age: 22
End: 2022-03-11

## 2022-03-11 VITALS
HEART RATE: 82 BPM | DIASTOLIC BLOOD PRESSURE: 82 MMHG | SYSTOLIC BLOOD PRESSURE: 127 MMHG | TEMPERATURE: 98 F | OXYGEN SATURATION: 98 % | RESPIRATION RATE: 18 BRPM

## 2022-03-11 PROCEDURE — G0463: CPT

## 2022-03-11 PROCEDURE — 86901 BLOOD TYPING SEROLOGIC RH(D): CPT

## 2022-03-11 PROCEDURE — 86850 RBC ANTIBODY SCREEN: CPT

## 2022-03-11 PROCEDURE — 86769 SARS-COV-2 COVID-19 ANTIBODY: CPT

## 2022-03-11 PROCEDURE — 59050 FETAL MONITOR W/REPORT: CPT

## 2022-03-11 PROCEDURE — 85025 COMPLETE CBC W/AUTO DIFF WBC: CPT

## 2022-03-11 PROCEDURE — 59025 FETAL NON-STRESS TEST: CPT

## 2022-03-11 PROCEDURE — 88307 TISSUE EXAM BY PATHOLOGIST: CPT

## 2022-03-11 PROCEDURE — 86900 BLOOD TYPING SEROLOGIC ABO: CPT

## 2022-03-11 PROCEDURE — 36415 COLL VENOUS BLD VENIPUNCTURE: CPT

## 2022-03-11 PROCEDURE — 90707 MMR VACCINE SC: CPT

## 2022-03-11 PROCEDURE — 86780 TREPONEMA PALLIDUM: CPT

## 2022-03-11 PROCEDURE — 85027 COMPLETE CBC AUTOMATED: CPT

## 2022-03-11 RX ADMIN — Medication 975 MILLIGRAM(S): at 08:51

## 2022-03-11 RX ADMIN — Medication 0.5 MILLILITER(S): at 09:29

## 2022-03-11 RX ADMIN — Medication 975 MILLIGRAM(S): at 09:44

## 2022-03-11 RX ADMIN — Medication 1 TABLET(S): at 14:09

## 2022-03-11 RX ADMIN — Medication 600 MILLIGRAM(S): at 05:22

## 2022-03-11 RX ADMIN — Medication 600 MILLIGRAM(S): at 14:09

## 2022-03-11 RX ADMIN — Medication 975 MILLIGRAM(S): at 02:33

## 2022-03-11 RX ADMIN — Medication 600 MILLIGRAM(S): at 12:08

## 2022-03-11 NOTE — PROGRESS NOTE ADULT - SUBJECTIVE AND OBJECTIVE BOX
CARMELO MARTINEZ is a 21y  now POD#2 s/p VA-primary  section @39w6d GA for active HSV, c/b difficulty delivering fetal head requiring assistance. S/p 3g ancef x24h.    S:    No acute events overnight.   The patient has no complaints.  Pain controlled with current treatment regimen.   She is ambulating without difficulty and tolerating PO.   + flatus/+BM/+ voiding   She endorses appropriate lochia, which is decreasing.   She is pumping for baby who is in NICU.  She denies fevers, chills, nausea and vomiting.   She denies lightheadedness, dizziness, palpitations, chest pain and SOB.     O:    Vital Signs Last 24 Hrs  T(C): 36.4 (10 Mar 2022 05:35), Max: 36.8 (09 Mar 2022 12:00)  T(F): 97.6 (10 Mar 2022 05:35), Max: 98.2 (09 Mar 2022 12:00)  HR: 79 (10 Mar 2022 05:35) (79 - 88)  BP: 138/77 (10 Mar 2022 05:35) (117/72 - 138/77)  RR: 18 (10 Mar 2022 05:35) (16 - 18)  SpO2: 97% (10 Mar 2022 05:35) (97% - 98%)    Gen: NAD, AOx3  Resp: breathing comfortably on RA  Abdomen:  Soft, non-tender, non-distended  Incision: ROGER in place. Maroon-colored blood noted, stable from day of delivery.  Uterus:  Fundus firm below umbilicus  VE:  Lochia as expected                          11.2   13.95 )-----------( 242      ( 09 Mar 2022 06:01 )             34.7     03-07    138  |  104  |  8.7  ----------------------------<  92  4.3   |  22.0  |  0.49<L>    Ca    9.5      07 Mar 2022 21:21    TPro  7.6  /  Alb  3.5  /  TBili  0.2<L>  /  DBili  x   /  AST  17  /  ALT  12  /  AlkPhos  161<H>  03      
POD # 3    Patient resting comfortably in NAD  Afebrile VSS  Abdomen  Soft Not tender  Incision C / D / I  Exterm  No Homans  Flatus ++  Voiding ++    Patient s/p c/s  Patient stable and doing well   DC to home   F/U office 3 days 
POD # 1    Patient resting in bed in NAD  Afebrile VSS  Abdomen  soft  Not tender  Incision  Dressing in place   dry   Extrem.  no homans   WBC  13.9   H / H  11.2  / 34.7  Platelet  242  O ++  VDRL Neg   HIV Neg  COVID ++ Ab    Patient s/p Vacuum assisted c/s delivery  Continue post op care   
POD # 2    patient resting comfortably in NAD  Afebrile VSS  Abdomen  Soft Not tender  Incision  Dressing in place  dry   Extrem  No Homans     WBC 10.2  H / H 10.7 / 33.3   platel.  234    Patient stable and doing well   Continue post op care   
CARMELO MARTINEZ is a 21y  now POD#1 s/p [facility]-primary  section @39w6d GA for active HSV, c/b difficulty delivering fetal head requiring assistance. On 3g ancef x24h.    S:    No acute events overnight.   The patient has no complaints.  Pain controlled with current treatment regimen.   She is ambulating without difficulty and tolerating PO.   + flatus/-BM/+ voiding   She endorses appropriate lochia, which is decreasing.   She is pumping for baby who is in NICU.  She denies fevers, chills, nausea and vomiting.   She denies lightheadedness, dizziness, palpitations, chest pain and SOB.     O:    T(C): 36.6 (22 @ 05:00), Max: 37 (22 @ 15:40)  HR: 65 (22 @ 05:00) (59 - 103)  BP: 96/58 (22 @ 05:00) (92/62 - 142/82)  RR: 16 (22 @ 05:00) (14 - 18)  SpO2: 98% (22 @ 05:00) (95% - 98%)    Gen: NAD, AOx3  Resp: breathing comfortably on RA  Abdomen:  Soft, non-tender, non-distended  Incision: ROGER in place. Maroon-colored blood noted, stable from yesterday.  Uterus:  Fundus firm below umbilicus  VE:  Lochia as expected                          11.2   13.95 )-----------( 242      ( 09 Mar 2022 06:01 )             34.7         138  |  104  |  8.7  ----------------------------<  92  4.3   |  22.0  |  0.49<L>    Ca    9.5      07 Mar 2022 21:21    TPro  7.6  /  Alb  3.5  /  TBili  0.2<L>  /  DBili  x   /  AST  17  /  ALT  12  /  AlkPhos  161<H>        
CARMELO MARTINEZ is a 21y  now POD#3 s/p VA-primary  section @39w6d GA for active HSV, c/b difficulty delivering fetal head requiring assistance. S/p 3g ancef x24h.    S:    No acute events overnight.   The patient has no complaints.  Pain controlled with current treatment regimen.   She is ambulating without difficulty and tolerating PO.   + flatus/+BM/+ voiding   She endorses appropriate lochia, which is decreasing.   She denies fevers, chills, nausea and vomiting.   She denies lightheadedness, dizziness, palpitations, chest pain and SOB.     O:    Vital Signs Last 24 Hrs  T(C): 36.7 (11 Mar 2022 05:30), Max: 36.7 (10 Mar 2022 15:45)  T(F): 98.1 (11 Mar 2022 05:30), Max: 98.1 (10 Mar 2022 15:45)  HR: 82 (11 Mar 2022 05:30) (81 - 82)  BP: 127/82 (11 Mar 2022 05:30) (127/82 - 128/72)  RR: 18 (11 Mar 2022 05:30) (18 - 18)  SpO2: 98% (11 Mar 2022 05:30) (98% - 98%)    Gen: NAD, AOx3  Resp: breathing comfortably on RA  Abdomen:  Soft, non-tender, non-distended  Incision: ROGER in place. Maroon-colored blood noted, stable from day of delivery.  Uterus:  Fundus firm below umbilicus  VE:  Lochia as expected                          11.2   13.95 )-----------( 242      ( 09 Mar 2022 06:01 )             34.7     -    138  |  104  |  8.7  ----------------------------<  92  4.3   |  22.0  |  0.49<L>    Ca    9.5      07 Mar 2022 21:21    TPro  7.6  /  Alb  3.5  /  TBili  0.2<L>  /  DBili  x   /  AST  17  /  ALT  12  /  AlkPhos  161<H>

## 2022-03-11 NOTE — PROGRESS NOTE ADULT - ASSESSMENT
A/P:  CARMELO MARTINEZ is a 21y  now POD#1 s/p [facility]-primary  section @39w6d GA for active HSV, c/b difficulty delivering fetal head requiring assistance. On 3g ancef x24h. Baby in NICU.  -Vital signs stable  -Hgb: 12.9 -> 11.2  -c/w ancef for 24h post-op.  -Voiding, tolerating PO  -Advance care as tolerated   -VTE ppx: encourage ambulation, SCDs while in bed, daily lovenox  -Continue routine postpartum and postoperative care and education  -Female infant in NICU  -Dispo: Patient to be discharged when meeting all postpartum and postoperative milestones and pending attending approval.  
A/P:  CARMELO MARTINEZ is a 21y  now POD#3 s/p VA-primary  section @39w6d GA for active HSV, c/b difficulty delivering fetal head requiring assistance. S/p 3g ancef x24h.   -Vital signs stable  -Hgb: 12.9 -> 11.2  -s/p ancef for 24h post-op.  -Voiding, tolerating PO  -Advance care as tolerated   -VTE ppx: encourage ambulation, SCDs while in bed, daily lovenox  -Continue routine postpartum and postoperative care and education  -Female infant at bedside  -Dispo: Patient likely dc today, pending attending approval.  
A/P:  CARMELO MARTINEZ is a 21y  now POD#2 s/p [facility]-primary  section @39w6d GA for active HSV, c/b difficulty delivering fetal head requiring assistance. S/p 3g ancef x24h. Baby in NICU.  -Vital signs stable  -Hgb: 12.9 -> 11.2  -s/p ancef for 24h post-op.  -Voiding, tolerating PO  -Advance care as tolerated   -VTE ppx: encourage ambulation, SCDs while in bed, daily lovenox  -Continue routine postpartum and postoperative care and education  -Female infant in NICU  -Dispo: Patient likely dc today, pending baby dispo and pending attending approval.

## 2022-03-14 ENCOUNTER — APPOINTMENT (OUTPATIENT)
Dept: OBGYN | Facility: CLINIC | Age: 22
End: 2022-03-14
Payer: COMMERCIAL

## 2022-03-14 VITALS
HEART RATE: 77 BPM | BODY MASS INDEX: 46.93 KG/M2 | WEIGHT: 292 LBS | SYSTOLIC BLOOD PRESSURE: 152 MMHG | DIASTOLIC BLOOD PRESSURE: 89 MMHG | HEIGHT: 66 IN

## 2022-03-14 PROBLEM — U07.1 COVID-19: Chronic | Status: ACTIVE | Noted: 2022-03-07

## 2022-03-14 PROBLEM — B00.9 HERPESVIRAL INFECTION, UNSPECIFIED: Chronic | Status: ACTIVE | Noted: 2022-03-07

## 2022-03-14 PROCEDURE — 0503F POSTPARTUM CARE VISIT: CPT

## 2022-03-14 NOTE — HISTORY OF PRESENT ILLNESS
[Postpartum Follow Up] : postpartum follow up [Complications:___] : no complications [Delivery Date: ___] : on [unfilled] [Primary C/S] : delivered by  section [Breastfeeding] : currently nursing [Clean/Dry/Intact] : clean, dry and intact [Healed] : healed [Intact] : was intact [Normal Skin] : normal appearance [Back to Normal] : is still enlarged [None] : no vaginal bleeding [Doing Well] : is doing well [FreeTextEntry3] : dressing removed [de-identified] : f/u 2 weeks

## 2022-03-15 ENCOUNTER — NON-APPOINTMENT (OUTPATIENT)
Age: 22
End: 2022-03-15

## 2022-03-15 DIAGNOSIS — Z78.9 OTHER SPECIFIED HEALTH STATUS: ICD-10-CM

## 2022-03-15 DIAGNOSIS — Z86.16 PERSONAL HISTORY OF COVID-19: ICD-10-CM

## 2022-03-15 DIAGNOSIS — B00.9 HERPESVIRAL INFECTION, UNSPECIFIED: ICD-10-CM

## 2022-03-15 RX ORDER — ASCORBIC ACID, CHOLECALCIFEROL, .ALPHA.-TOCOPHEROL ACETATE, DL-, PYRIDOXINE, FOLIC ACID, CYANOCOBALAMIN, CALCIUM, FERROUS FUMARATE, MAGNESIUM, DOCONEXENT 85; 200; 10; 25; 1; 12; 140; 27; 45; 300 [IU]/1; [IU]/1; [IU]/1; [IU]/1; MG/1; UG/1; MG/1; MG/1; MG/1; MG/1
CAPSULE, GELATIN COATED ORAL
Refills: 0 | Status: ACTIVE | COMMUNITY

## 2022-03-15 RX ORDER — VALACYCLOVIR HYDROCHLORIDE 1 G/1
TABLET, FILM COATED ORAL
Refills: 0 | Status: ACTIVE | COMMUNITY

## 2022-03-15 RX ORDER — CHROMIUM 200 MCG
TABLET ORAL
Refills: 0 | Status: ACTIVE | COMMUNITY

## 2022-03-22 LAB — SURGICAL PATHOLOGY STUDY: SIGNIFICANT CHANGE UP

## 2022-03-28 ENCOUNTER — APPOINTMENT (OUTPATIENT)
Dept: OBGYN | Facility: CLINIC | Age: 22
End: 2022-03-28
Payer: COMMERCIAL

## 2022-03-28 VITALS — WEIGHT: 280 LBS | HEIGHT: 66 IN | BODY MASS INDEX: 45 KG/M2

## 2022-03-28 PROCEDURE — 0503F POSTPARTUM CARE VISIT: CPT

## 2022-03-28 NOTE — HISTORY OF PRESENT ILLNESS
[0/10] : no pain reported [Fever] : no fever [Chills] : no chills [Nausea] : no nausea [Vomiting] : no vomiting [Diarrhea] : no diarrhea [Vaginal Bleeding] : no vaginal bleeding [Dysuria] : no dysuria [Vaginal Discharge] : no vaginal discharge [Clean/Dry/Intact] : clean, dry and intact [Healed] : healed [None] : no vaginal bleeding [Normal] : the vagina was normal [Doing Well] : is doing well [No Sign of Infection] : is showing no signs of infection [Sutures Removed] : sutures were not removed [Staples Removed] : staples were not removed [de-identified] : benign exam [de-identified] : benign [de-identified] : F/U 4 weeks

## 2022-03-31 ENCOUNTER — APPOINTMENT (OUTPATIENT)
Dept: OBGYN | Facility: CLINIC | Age: 22
End: 2022-03-31
Payer: COMMERCIAL

## 2022-03-31 VITALS
WEIGHT: 280 LBS | HEART RATE: 80 BPM | SYSTOLIC BLOOD PRESSURE: 110 MMHG | DIASTOLIC BLOOD PRESSURE: 69 MMHG | HEIGHT: 66 IN | BODY MASS INDEX: 45 KG/M2

## 2022-03-31 PROCEDURE — 0503F POSTPARTUM CARE VISIT: CPT

## 2022-03-31 NOTE — HISTORY OF PRESENT ILLNESS
[FreeTextEntry1] : PT COMPLAINS OF IRRITATION NEAR INCISION.\par \par Patient presents complaining of, mild discomfort on the  section scar, on the right\par Patient denies any fevers, or discharge from the incision\par Patient examined\par Incision looks clean, dry, intact.\par Small piece of suture protruding from the left aspect of the incision,,, removed\par Essentially benign exam

## 2022-04-25 ENCOUNTER — APPOINTMENT (OUTPATIENT)
Dept: OBGYN | Facility: CLINIC | Age: 22
End: 2022-04-25
Payer: COMMERCIAL

## 2022-04-25 VITALS — DIASTOLIC BLOOD PRESSURE: 88 MMHG | SYSTOLIC BLOOD PRESSURE: 126 MMHG | HEART RATE: 70 BPM | WEIGHT: 284 LBS

## 2022-04-25 PROCEDURE — 0503F POSTPARTUM CARE VISIT: CPT

## 2022-04-25 NOTE — HISTORY OF PRESENT ILLNESS
[Postpartum Follow Up] : postpartum follow up [Last Pap Date: ___] : Last Pap Date: [unfilled] [Delivery Date: ___] : on [unfilled] [Primary C/S] : delivered by  section [Female] : Delivery History: baby girl [Complications:___] : no complications [Wt. ___] : weighing [unfilled] [Rhogam] : Rhogam was not administered [Rubella Vaccine] : Rubella vaccine was not administered [Pertussis Vaccine] : Pertussis vaccine was not administered [BTL] : no tubal ligation [Breastfeeding] : not currently nursing [Abdominal Pain] : no abdominal pain [Back Pain] : no back pain [Clean/Dry/Intact] : clean, dry and intact [Erythema] : not erythematous [Swelling] : not swollen [Back to Normal] : is back to normal in size [None] : no vaginal bleeding [Cervix Sample Taken] : cervical sample not taken for a Pap smear [Not Done] : Examination of breasts not done [Doing Well] : is doing well [No Sign of Infection] : is showing no signs of infection [Excellent Pain Control] : has excellent pain control [Regressing] : is regressing [Sutures Removed] : sutures were not removed [Staples Removed] : staples were not removed [FreeTextEntry1] : benign exam\par healed well\par Patient does not desire OCP\par F/u 6 months

## 2022-08-23 NOTE — OB RN DELIVERY SUMMARY - NS_CORDBLDBNKA_OBGYN_ALL_OB
No
Quality 226: Preventive Care And Screening: Tobacco Use: Screening And Cessation Intervention: Patient screened for tobacco use and is an ex/non-smoker
Detail Level: Zone
Quality 176: Tuberculosis Screening Prior To First Course Biologic And/Or Immune Response Modifier Therapy: Patient receiving first-time biologic DMARD therapy, TB Screening Performed and Results Interpreted within 12 months

## 2022-09-30 ENCOUNTER — APPOINTMENT (OUTPATIENT)
Dept: OBGYN | Facility: CLINIC | Age: 22
End: 2022-09-30

## 2022-09-30 VITALS
WEIGHT: 293 LBS | SYSTOLIC BLOOD PRESSURE: 134 MMHG | HEIGHT: 67 IN | RESPIRATION RATE: 14 BRPM | HEART RATE: 80 BPM | BODY MASS INDEX: 45.99 KG/M2 | DIASTOLIC BLOOD PRESSURE: 83 MMHG

## 2022-09-30 DIAGNOSIS — Z00.00 ENCOUNTER FOR GENERAL ADULT MEDICAL EXAMINATION W/OUT ABNORMAL FINDINGS: ICD-10-CM

## 2022-09-30 PROCEDURE — 99395 PREV VISIT EST AGE 18-39: CPT

## 2022-09-30 NOTE — PLAN
[FreeTextEntry1] : Patient is a 21-year-old  1 para 1 last menstrual period 2022\par Patient presents for annual visit complaining of vaginal odor after relations over the past year\par Physical exam reveals a well-developed well-nourished morbidly obese female,,, BMI 46\par Heart regular rhythm and rate, lungs clear, breast no mass nontender no skin lesion or nipple discharge, abdomen soft nontender no organomegaly.\par Pelvic exam shows normal female external genitalia, vagina no lesions, cervix appropriate size nontender, uterus anteverted normal size nontender adnexa no mass nontender.\par Pap smear performed\par Vaginal culture performed\par We will await culture results prior to initiating treatment\par Patient states she had COVID back in January of this year denies any residual symptoms or deficits,,, states she has been vaccinated but not boosted\par Patient will be contacted after culture results are available for further treatment otherwise follow-up 1 year or prior to that as needed

## 2022-09-30 NOTE — HISTORY OF PRESENT ILLNESS
[FreeTextEntry1] : Patient is a 21-year-old  1 para 1 last menstrual period 2022\par Patient presents for annual visit,,, complaining of vaginal odor especially after relations

## 2022-10-05 LAB — CYTOLOGY CVX/VAG DOC THIN PREP: NORMAL

## 2022-10-06 LAB
A VAGINAE DNA VAG QL NAA+PROBE: ABNORMAL
BVAB2 DNA VAG QL NAA+PROBE: ABNORMAL
C KRUSEI DNA VAG QL NAA+PROBE: NEGATIVE
C TRACH RRNA SPEC QL NAA+PROBE: NEGATIVE
MEGA1 DNA VAG QL NAA+PROBE: ABNORMAL
N GONORRHOEA RRNA SPEC QL NAA+PROBE: NEGATIVE
T VAGINALIS RRNA SPEC QL NAA+PROBE: NEGATIVE

## 2022-11-11 ENCOUNTER — APPOINTMENT (OUTPATIENT)
Dept: OBGYN | Facility: CLINIC | Age: 22
End: 2022-11-11

## 2022-11-11 VITALS — DIASTOLIC BLOOD PRESSURE: 83 MMHG | HEART RATE: 97 BPM | WEIGHT: 201 LBS | SYSTOLIC BLOOD PRESSURE: 127 MMHG

## 2022-12-12 ENCOUNTER — APPOINTMENT (OUTPATIENT)
Dept: OBGYN | Facility: CLINIC | Age: 22
End: 2022-12-12

## 2022-12-12 VITALS
HEART RATE: 84 BPM | HEIGHT: 67 IN | WEIGHT: 293 LBS | DIASTOLIC BLOOD PRESSURE: 72 MMHG | SYSTOLIC BLOOD PRESSURE: 111 MMHG | BODY MASS INDEX: 45.99 KG/M2

## 2022-12-12 DIAGNOSIS — N76.0 ACUTE VAGINITIS: ICD-10-CM

## 2022-12-12 PROCEDURE — 99213 OFFICE O/P EST LOW 20 MIN: CPT
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
## 2022-12-12 NOTE — PLAN
[FreeTextEntry1] : Patient is a 22-year-old  1 para 1-0-0-1 last menstrual period 2022\par Patient presents for evaluation and states that she had recently relations with a male with unprotected,,, and was advised by the male to be checked out\par Patient does not provide any further information of what specific possible infectious entity is to be evaluated\par Physical exam reveals a well-developed well-nourished female no apparent distress,,, morbidly obese,,, BMI 48\par Normal female external genitalia, vagina lesions, no discharge, cervix appropriate size nontender.  Vaginal cultures performed\par Patient will be sent to the laboratory for blood work for, HSV 1 and 2 IgG, VDRL, HIV, hepatitis B, hepatitis C,\par Patient will be up treated appropriately pending the results of the testing and blood work\par

## 2022-12-19 LAB
A VAGINAE DNA VAG QL NAA+PROBE: NORMAL
BVAB2 DNA VAG QL NAA+PROBE: NORMAL
C KRUSEI DNA VAG QL NAA+PROBE: NEGATIVE
C TRACH RRNA SPEC QL NAA+PROBE: NEGATIVE
HHV SPEC CULT: NORMAL
HSV TYPE 1: NORMAL
HSV TYPE 2: NORMAL
MEGA1 DNA VAG QL NAA+PROBE: NORMAL
N GONORRHOEA RRNA SPEC QL NAA+PROBE: NEGATIVE
T VAGINALIS RRNA SPEC QL NAA+PROBE: NEGATIVE

## 2023-01-23 ENCOUNTER — ASOB RESULT (OUTPATIENT)
Age: 23
End: 2023-01-23

## 2023-01-23 ENCOUNTER — APPOINTMENT (OUTPATIENT)
Dept: OBGYN | Facility: CLINIC | Age: 23
End: 2023-01-23
Payer: COMMERCIAL

## 2023-01-23 VITALS
WEIGHT: 293 LBS | BODY MASS INDEX: 45.99 KG/M2 | SYSTOLIC BLOOD PRESSURE: 112 MMHG | HEIGHT: 67 IN | DIASTOLIC BLOOD PRESSURE: 77 MMHG | HEART RATE: 74 BPM

## 2023-01-23 PROCEDURE — 76830 TRANSVAGINAL US NON-OB: CPT

## 2023-01-23 PROCEDURE — 99213 OFFICE O/P EST LOW 20 MIN: CPT | Mod: 25

## 2023-01-23 NOTE — HISTORY OF PRESENT ILLNESS
[FreeTextEntry1] : Patient 22-year-old  1 para 1 last menstrual period \par Patient presents for evaluation after having had a home positive pregnancy test\par

## 2023-01-23 NOTE — PLAN
[FreeTextEntry1] : Patient is a 22-year-old  1 para 1 last menstrual period 2022\par Patient presents for evaluation after obtaining a home positive pregnancy test\par Patient was seen recently in  had a full exam including Pap smear\par According to last menstrual period patient is approximately 8 weeks pregnant at this point and will be having a pelvic sonogram for OB dating\par Pelvic sono\par Uterus 7.62 x 8.71 x 5.85\par Cervical length 3.84 cm\par Left ovary 2.9 x 3.1 x 2.25\par Right ovary not visualized\par Evidence of injury pregnancy fetal pole measuring approximately 6 weeks and 5 days fetal heart at 137 yolk sac 2.5's\par Results discussed with patient\par Patient is unsure if she wished to continue the pregnancy or not\par Options discussed\par Patient to return in 2 weeks for more detail OB dating sonogram and will consider her options and possibly have a referral for procedure

## 2023-01-31 ENCOUNTER — APPOINTMENT (OUTPATIENT)
Dept: OBGYN | Facility: CLINIC | Age: 23
End: 2023-01-31
Payer: COMMERCIAL

## 2023-01-31 VITALS
HEIGHT: 67 IN | DIASTOLIC BLOOD PRESSURE: 73 MMHG | SYSTOLIC BLOOD PRESSURE: 114 MMHG | WEIGHT: 293 LBS | BODY MASS INDEX: 45.99 KG/M2 | OXYGEN SATURATION: 99 % | HEART RATE: 71 BPM

## 2023-01-31 DIAGNOSIS — Z3A.21 21 WEEKS GESTATION OF PREGNANCY: ICD-10-CM

## 2023-01-31 PROCEDURE — S0190: CPT

## 2023-01-31 PROCEDURE — 99214 OFFICE O/P EST MOD 30 MIN: CPT

## 2023-01-31 RX ORDER — MIFEPRISTONE 200 MG
200 TABLET ORAL
Refills: 0 | Status: COMPLETED | OUTPATIENT
Start: 2023-01-31

## 2023-01-31 RX ORDER — MISOPROSTOL 200 UG/1
200 TABLET ORAL
Qty: 4 | Refills: 0 | Status: ACTIVE | COMMUNITY
Start: 2023-01-31 | End: 1900-01-01

## 2023-01-31 RX ADMIN — Medication 0 MG: at 00:00

## 2023-01-31 NOTE — PLAN
[FreeTextEntry1] : 23 yo with plan for medication   and 2/3.\par \par 1. Medical  \par - All consents signed today, all questions/concerns addressed\par - Patient offered pamphlet for support services \par \par 2. Scheduling: Mife/miso  and 2/3\par - mifepristone consents signed, mifepristone pamphlet given\par - Mifepristone 200 mg administered, lot #:[id number] exp: 2025 NDC [id number]\par - misoprostol 800mcg rx sent\par \par 3. ID/Neuro\par -GC/CT - recent neg\par -ibuprofen 600 mg q6 hours Rx sent\par -Zofran Rx sent\par -Reviewed Tylenol 975mg or 1000mg q6 hours\par \par \par 4. Labs/Blood type\par - CBC no history of anemia/WNL\par - Patient is Rh POS\par  \par 5. Contraception\par - Patient counseled on all contraceptive options\par - Quick start ocp reviewed, rx sent\par \par 6. Post op\par - Pt given option for 2 week in-office follow up vs. 1 week phone call with office and home pregnancy test in 4 weeks\par - pt desires: in person\par - Medical  instruction and information packet given, reviewed bleeding and infection precautions.  24 hour contact information reviewed and provided\par - all questions/concerns addressed\par \par \par

## 2023-01-31 NOTE — HISTORY OF PRESENT ILLNESS
[FreeTextEntry1] : 21 yo  (c-secx1) at 7w6d by early ultrasound presenting for induced . Pt requesting medication .\par \par All: NKDA\par Meds: denies\par Obhx: c-scex1\par Gynhx: irregular periods, chlamydia 2019 (neg 10/2022)\par PMH/PSH: as above\par \par \par Medical \par \par Patient denies medical history of:\par Liver disease, Renal failure, Chronic adrenal failure, Long term systemic corticosteroid use, IUD in place, Anticoagulant use of hemorrhagic disorder, Inherited porphyrias, Anemia, Allergy to mifepristone, misoprostol or other prostaglandins\par \par \par Options for the pregnancy were discussed with the patient, including continuation of pregnancy, medical , dilation and curettage (D&C) in the office under local anesthesia or in the operating room under sedation.   They do not desire to continue the pregnancy and are requesting medication . They understand that 2-7% of people who take medication  will need more medication or a surgical procedure to empty the uterus. They understand that medication  is not reversible. Common side effects and additional risks below reviewed.\par \par Common side effects: cramping, bleeding, low grade fever, diarrhea, nausea, vomiting, headache, dizziness, back pain, feeling tired\par \par The risks of medication  including:\par -	Continuing pregnancy, pregnancy tissue or blood clots in the uterus and the need for further medication or surgery\par -	Incomplete  causing heavy bleeding, infection, or both (which may require other testing or treatments such as further medication or surgery)\par -	Bleeding too much or too long which may require further treatment with medication or surgery, or a blood transfusion\par -	Infection in the uterus, which may require further treatment with medication, surgery or antibiotics.  It may also require hospital admission\par -	Allergic reaction to any or all of the medications used\par \par  DANCO patient agreement reviewed and signed. A copy was given to the patient, along with the user guide.\par \par 1.	Patient agrees to undergo surgical  if medical  fails.  \par 2.	The patient states they have access to a phone and a nearby hospital if the need for emergency services arises.  \par 3.	The patient states they have someone to be with them at the time of the medical . \par 4.	The patient is willing and able to follow up to confirm the pregnancy was successfully terminated.\par \par The patient was thoroughly counseled on instructions for medical  and the warning signs of any problems.  They voiced understanding of these warning signs and when to call and were provided with 24-hour contact information for on-call and available physicians. The patient also understands it is their responsibility to bring to the attention of their physician any unusual symptoms following the procedure and to report to follow-up phone calls and/or examinations. \par \par They understand the need to call the office if they have no bleeding in 24 hours after misoprostol as this could mean either the medical  did not work, or something such as an ectopic pregnancy occurred. \par \par The patient is sure of their decision and denies any coercion from family, friends or healthcare providers. The patient had the opportunity to ask questions and all questions were answered. \par \par \par CONTRACEPTION COUNSELING\par Contraceptive Counseling\par \par All forms of reversible contraception including combined hormonal contraception, progestin-only contraceptives, IUDs, and implants were reviewed with the patient.  The risks, benefits, and alternatives were discussed. \par  \par CHC including pill, patch, and ring, was discussed with the patient. Common side-effects of CHC were reviewed.  Typical effectiveness rates of 93% were reviewed.\par  \par The patient as instructed in the correct use of combined methods. The patient was also counseled about the reduced contraceptive effectiveness if doses are missed and the recommendation for condom use for 1 week after.  The patient was counseled on the risk of blood clot and stroke, but that the risk of stroke from pregnancy outweighs that from CHC.  The patient denies history of blood clot/hypertension/CVA/migraine with aura/breast cancer/liver disease/gallbladder disease/family history of first degree relative with DVT/CVA.   Reviewed the option of continuous CHC and that breakthrough bleeding may occur with a continuous regimen.\par  Pt is interested in the CHC pills, quick start reviewed.\par \par

## 2023-02-06 ENCOUNTER — APPOINTMENT (OUTPATIENT)
Dept: OBGYN | Facility: CLINIC | Age: 23
End: 2023-02-06

## 2023-02-13 ENCOUNTER — APPOINTMENT (OUTPATIENT)
Dept: OBGYN | Facility: CLINIC | Age: 23
End: 2023-02-13
Payer: COMMERCIAL

## 2023-02-13 VITALS
DIASTOLIC BLOOD PRESSURE: 84 MMHG | SYSTOLIC BLOOD PRESSURE: 124 MMHG | BODY MASS INDEX: 45.99 KG/M2 | HEIGHT: 67 IN | WEIGHT: 293 LBS

## 2023-02-13 DIAGNOSIS — N76.0 ACUTE VAGINITIS: ICD-10-CM

## 2023-02-13 PROCEDURE — 99213 OFFICE O/P EST LOW 20 MIN: CPT

## 2023-02-13 NOTE — PLAN
[FreeTextEntry1] : Patient is a 22-year-old  2 para 1-0-1-1 last menstrual in 2022\par Patient presents for evaluation complaining of vaginal discharge and states she had undergone a procedure on 2023 by Dr. Hammond for 7-week pregnancy\par Patient believes she has a vaginal infection with bacteria,,, based on previous experiences with similar symptoms\par Physical exam reveals a well-developed well-nourished morbidly obese female no apparent distress,,, pulm exam shows normal female external genitalia, vagina no lesions evidence of white discharge,, no cervical motion tenderness,, no uterine tenderness or adnexal tenderness\par Vaginal culture performed\par Most likely findings represent a vaginal candidiasis,,, will await final results prior to initiating treatment\par Questions answered\par Patient that she understands

## 2023-02-13 NOTE — PHYSICAL EXAM
[Chaperone Present] : A chaperone was present in the examining room during all aspects of the physical examination [Labia Majora] : normal [Labia Minora] : normal [Normal] : normal [Tenderness] : nontender [Anteversion] : anteverted [Mass ___ cm] : no uterine mass was palpated [Uterine Adnexae] : normal

## 2023-02-21 DIAGNOSIS — B96.89 ACUTE VAGINITIS: ICD-10-CM

## 2023-02-21 DIAGNOSIS — N76.0 ACUTE VAGINITIS: ICD-10-CM

## 2023-02-23 LAB
A VAGINAE DNA VAG QL NAA+PROBE: NORMAL
BVAB2 DNA VAG QL NAA+PROBE: ABNORMAL
C KRUSEI DNA VAG QL NAA+PROBE: NEGATIVE
C TRACH RRNA SPEC QL NAA+PROBE: NEGATIVE
MEGA1 DNA VAG QL NAA+PROBE: ABNORMAL
N GONORRHOEA RRNA SPEC QL NAA+PROBE: NEGATIVE
T VAGINALIS RRNA SPEC QL NAA+PROBE: NEGATIVE

## 2023-02-27 ENCOUNTER — APPOINTMENT (OUTPATIENT)
Dept: OBGYN | Facility: CLINIC | Age: 23
End: 2023-02-27
Payer: COMMERCIAL

## 2023-02-27 VITALS — OXYGEN SATURATION: 97 % | HEART RATE: 86 BPM | DIASTOLIC BLOOD PRESSURE: 79 MMHG | SYSTOLIC BLOOD PRESSURE: 121 MMHG

## 2023-02-27 PROCEDURE — 99212 OFFICE O/P EST SF 10 MIN: CPT | Mod: 25

## 2023-02-27 PROCEDURE — 76830 TRANSVAGINAL US NON-OB: CPT

## 2023-02-27 NOTE — PLAN
[FreeTextEntry1] : 21 yo s/p successful medication  on OCPs for contraception. \par - return to Dr. Cornejo for routine gyn care.

## 2023-02-27 NOTE — PROCEDURE
[F/U Medical ] : f/u medical  [Transvaginal Ultrasound] : transvaginal ultrasound [Anteverted] : anteverted [FreeTextEntry3] : Thin endometrial echo [FreeTextEntry4] : Completed medication .

## 2023-02-27 NOTE — PHYSICAL EXAM
[Chaperone Present] : A chaperone was present in the examining room during all aspects of the physical examination [FreeTextEntry1] : Anabell Mello LPN [Appropriately responsive] : appropriately responsive [Alert] : alert [No Acute Distress] : no acute distress [Soft] : soft [Non-tender] : non-tender [Non-distended] : non-distended

## 2023-02-27 NOTE — HISTORY OF PRESENT ILLNESS
[FreeTextEntry1] : 23 yo  s/p medication  on  and 2/3 presenting for follow up. Pt reports heavy cramping/bleeding x 1 hour followed by spotting for 2-3 weeks. She was seen by Dr. BARKLEY for BV which she took treatment after bleeding stopped. She started OCPs after bleeding stopped. Reviewed to give 2 months for her period to regulate on the pills.

## 2023-03-06 NOTE — HISTORY OF PRESENT ILLNESS
[FreeTextEntry1] : Patient is a 22-year-old  2 para 1-0-1-1 last menstrual period 2022\par Patient presents for follow-up after undergoing a procedure with Dr. Hammond on 2023\par Patient also complaining of vaginal discharge
04-Mar-2023

## 2023-03-14 NOTE — OB PST NOTE - PRO BLOOD TYPE INFANT
Does NOT APPEAR VISUALLY SIGNIFICANT. O positive Detail Level: Detailed Quality 110: Preventive Care And Screening: Influenza Immunization: Influenza Immunization not Administered because Patient Refused.

## 2023-07-15 NOTE — OB RN TRIAGE NOTE - NS_PRENATALHARD_OBGYN_ALL_OB
Occupational Therapy    Visit Type: treatment  SUBJECTIVE  Patient agreed to participate in therapy this date.  \"I feel ok\"  Patient / Family Goal: maximize function and return home    OBJECTIVE      Vitals:  At start of session, patient in supine, BP: 97/56. Raised head of bed, BP raised to 113/61. Attempted seated edge of bed, BP: 79/47. Returned to supine, BP: 111/67.    Patient wearing TRINO stockings, awaiting abdominal binder.       Sitting Balance  (LATRICIA = base of support)  Static      - Trial 1 details: stand by assist       Bed Mobility  - Repositioning in bed: supervision  - Supine to sit: supervision  - Sit to supine: minimal assist      Activities of Daily Living (ADLs)  Eating:   - Assist: supervision (drinking water/juice, utilizing LUE and RUE as helper hand)    Interventions  Neuromuscular Re-Education  LUE neuro re-education with yellow theraputty. 2 sets x5 reps of gross grasp, MP flexion, thumb flexion, finger extension, finger abduction, and tip pinch. Requires set-up for each exercise.    BUE ROM exercises x10 reps each: shoulder flexion, shoulder horizontal abduction, elbow flexion/extension, forearm supination/pronation, punches, and finger flexion/extension.  Skilled input: verbal instruction/cues and tactile instruction/cues  Verbal Consent: for clothing adjustments for techniques, therapist position for techniques and hand placement and palpation for techniques as described above and how they are pertinent to the patient's plan of care.         ASSESSMENT  Impairments: activity tolerance, balance, coordination/proprioception, range of motion, strength, safety awareness and cognitive  Functional Limitations: functional mobility, grooming, toileting, IADLs, dressing, showering and functional transfers    Treatment focused on LUE neuro re-education with theraputty. Patient with variable BP this session, minimal out of bed activity. Patient wearing TRINO stockings, awaiting abdominal binder. Patient  will benefit from continued LUE neuro re-education with focus on strengthening and coordination.  Progress: progressing toward goals    Therapy Participation: This patient participated in all scheduled occupational therapy time this session.    Education:   - Present and ready to learn: patient  Education provided during session:  - Results of above outlined education: Verbalizes understanding    Patient at End of Session:   Location: in bed  Safety measures: alarm system in place/re-engaged and call light within reach  Handoff to: nurse    PLAN  Suggestions for next session as indicated: Balance, strength, compensatory strategies, functional mobility  Interventions: activity tolerance training, ADL retraining, balance, IADL, patient education, upper extremity strengthening/ROM, therapeutic activity, therapeutic exercise and safety training  Agreement to plan and goals: patient agrees with goals and treatment plan      GOALS  Short Term Goals (STGs): to be met 7 days from date established, unless otherwise stated.  - Patient will complete self-feeding at supervision level with adaptive equipment as deemed appropriate. - MET  - Patient will complete grooming at minimal assist level with adaptive equipment as deemed appropriate. - MET  - Patient will complete upper body dressing at moderate assist level with adaptive equipment as deemed appropriate. - MET  Long Term Goals (LTGs): to be met by discharge from rehab program.  - Patient will complete self-feeding at supervision level with adaptive equipment as deemed appropriate.  - Patient will complete grooming at supervision level with adaptive equipment as deemed appropriate.  - Patient will complete bathing at minimal assist level with adaptive equipment as deemed appropriate.  - Patient will complete upper body dressing at supervision level with adaptive equipment as deemed appropriate.  - Patient will complete lower body dressing at minimal assist level with  adaptive equipment as deemed appropriate.  - Patient will complete toileting at minimal assist level with adaptive equipment as deemed appropriate.  - Patient will complete toilet transfer at minimal assist level with adaptive equipment as deemed appropriate.  - Patient will complete tub transfer at minimal assist level with adaptive equipment as deemed appropriate.            Therapy procedure time and total treatment time can be found documented on the Time Entry flowsheet   Available

## 2023-10-05 NOTE — OB RN DELIVERY SUMMARY - NSDELAYEDCLAMPA_OBGYN_ALL_OB
Prescription refilled.
Requested Prescriptions     Pending Prescriptions Disp Refills    Continuous Blood Gluc Sensor (FREESTYLE HILDA 2 SENSOR) MISC [Pharmacy Med Name: Nando Callahan 2 SENSOR] 2 each 5     Si APPLICATORS BY DOES NOT APPLY ROUTE EVERY 2 MONTHS FOR 1 DOSE       Last Clinic Visit:  2023     Next Clinic Appointment:  10/13/2023
Yes

## 2023-10-20 NOTE — OB PST NOTE - TEMPERATURE IN CELSIUS (DEGREES C)
Surgical Progress Note    POSTOPERATIVE DAY:  2 Days Post-Op    ADMISSION DATE:  10/18/2023  CURRENT HOSPITAL DAY:  Hospital Day: 3    Subjective   Mishel Cyr is a 63 year old female s/p colostomy reversal, POD #2.  Patient passing flatus, along with  A bowel movement this am.  Ambulating in the halls. Tolerating full liquids.  Reports upset stomach with medication use.  Afebrile.     Labs, vitals signs and I/O reviewed.      Nursing concerns and plan of care discussed bedside RN    Objective     VS:  VITAL SIGNS:    Vital Last Value 24 Hour Range   Temperature 97.8 °F (36.6 °C) Temp  Min: 97.6 °F (36.4 °C)  Max: 97.9 °F (36.6 °C)   Pulse 78 Pulse  Min: 78  Max: 95   Respiratory 16 Resp  Min: 16  Max: 18   Blood Pressure 124/63 BP  Min: 117/64  Max: 124/63   Pulse Oximetry 94 % SpO2  Min: 93 %  Max: 100 %     Body mass index is 22.74 kg/m².    I/O last 3 completed shifts:  In: 900 [P.O.:900]  Out: 140 [Drains:140]      PHYSICAL EXAM:    Constitutional:  The patient is alert, oriented and cooperative, in no acute distress.   Integument: Prevena wound vac currently intact.   CHARBEL drain with serosanguinous output, 180 mls/24 hours.   Cardiovascular:  Normal heart rate.   Respiratory:  Normal respiratory effort   Abdomen: Soft, mild tenderness throughout.  Non distended.    Neurological:  Full strength of upper and lower extremities, equal bilaterally      LABORATORY DATA:  Recent Labs   Lab 10/20/23  0452 10/19/23  0547   WBC 9.2 11.9*   HGB 9.4* 11.2*   HCT 28.8* 34.6*    92*   No results found    Assessment:    63 year old female s/p colostomy reversal on 10/18/2023. Hemodynamically stable.  Labs unremarkable.  Pain controlled.  Bowel function established with toleration of slow  diet advancement.  Tolerating activity progression.      Plan:    Advance diet to regular diet.   Encourage gum use.  Okay to use Simethicone/Carafate added for use with oral medicatiosn.      Increase ambulation, up in the halls  QID with nursing staff, educated on the importance.      Continue Prevena wound vac-will go home with Prevena wound vac, nursing to provide instruction.      SCD's, Lovenox, and IS for prophylaxis.      Continue current pain regime.     Plan for discharge tomorrow-follow up scheduled.         Case reviewed and discussed with Dr. Leandro De Oliveira, [facility]      The patient has been seen and examined.    I agree with the above note.  Is doing very well and should be ready for discharge by tomorrow morning.    Devendra Reeves MD FACS     36.9

## 2024-01-02 ENCOUNTER — APPOINTMENT (OUTPATIENT)
Dept: OBGYN | Facility: CLINIC | Age: 24
End: 2024-01-02

## 2024-01-05 NOTE — OB RN DELIVERY SUMMARY - APGAR COMPLETED BY
"Shweta Canela (25 y.o. Female)     Discharged 24.    Needs approval.    From:Henna Dillon LPN, Utilization Review  Phone #[phone]  Fax #[phone]        Date of Birth   1998    Social Security Number       Address   [address] [place] [address]    Home Phone   [phone]    MRN   [id number]       Latter-day   Religious    Marital Status                               Admission Date   24    Admission Type   Elective    Admitting Provider   Selina Curtis MD    Attending Provider       Department, Room/Bed   [facility] ANTEPARTUM, N329/       Discharge Date   2024    Discharge Disposition   Home or Self Care    Discharge Destination                                 Attending Provider: (none)   Allergies: Bactrim [Sulfamethoxazole-trimethoprim], Ciprodex [Ciprofloxacin-dexamethasone]    Isolation: None   Infection: None   Code Status: Prior    Ht: 154.9 cm (61\")   Wt: 71.8 kg (158 lb 6.4 oz)    Admission Cmt: None   Principal Problem:  labor in third trimester without delivery [O60.03]                   Active Insurance as of 2024       Primary Coverage       Payor Plan Insurance Group Employer/Plan Group    [employer]UE [employer] PPO [id number]       Payor Plan Address Payor Plan Phone Number Payor Plan Fax Number Effective Dates    [phone]  2023 - None Entered    [facility] [id number]         Subscriber Name Subscriber Birth Date Member ID       SHWETA CANELA 1998 [id number]               Secondary Coverage       Payor Plan Insurance Group Employer/Plan Group    [facility]        Payor Plan Address Payor Plan Phone Number Payor Plan Fax Number Effective Dates    [address] [phone]  2023 - None Entered    [facility] [id number]         Subscriber Name Subscriber Birth Date Member ID       SHWETA CANELA 1998 [id number]               "       Emergency Contacts        (Rel.) Home Phone Work Phone Mobile Phone    Sonya Keyes (Mother) [phone] -- [phone]    JO DIETZ (Spouse) [phone] -- [phone]              Discharge Summary    No notes of this type exist for this encounter.        Neonatologist

## 2024-03-28 DIAGNOSIS — Z30.40 ENCOUNTER FOR SURVEILLANCE OF CONTRACEPTIVES, UNSPECIFIED: ICD-10-CM

## 2024-03-28 DIAGNOSIS — Z30.011 ENCOUNTER FOR INITIAL PRESCRIPTION OF CONTRACEPTIVE PILLS: ICD-10-CM

## 2024-03-28 DIAGNOSIS — Z33.2 ENCOUNTER FOR ELECTIVE TERMINATION OF PREGNANCY: ICD-10-CM

## 2024-03-28 DIAGNOSIS — R63.8 OTHER SYMPTOMS AND SIGNS CONCERNING FOOD AND FLUID INTAKE: ICD-10-CM

## 2024-03-28 DIAGNOSIS — E66.01 OBESITY COMPLICATING PREGNANCY, UNSPECIFIED TRIMESTER: ICD-10-CM

## 2024-03-28 DIAGNOSIS — O99.210 OBESITY COMPLICATING PREGNANCY, UNSPECIFIED TRIMESTER: ICD-10-CM

## 2024-03-28 RX ORDER — IBUPROFEN 600 MG/1
600 TABLET, FILM COATED ORAL 4 TIMES DAILY
Qty: 60 | Refills: 0 | Status: DISCONTINUED | COMMUNITY
Start: 2023-01-31 | End: 2024-03-28

## 2024-03-28 RX ORDER — ONDANSETRON 4 MG/1
4 TABLET ORAL
Qty: 20 | Refills: 0 | Status: DISCONTINUED | COMMUNITY
Start: 2023-01-31 | End: 2024-03-28

## 2024-03-28 RX ORDER — MIFEPRISTONE 200 MG
200 TABLET ORAL
Refills: 0 | Status: DISCONTINUED | COMMUNITY
Start: 2023-01-31 | End: 2024-03-28

## 2024-03-28 RX ORDER — METRONIDAZOLE 7.5 MG/G
0.75 GEL VAGINAL
Qty: 1 | Refills: 0 | Status: DISCONTINUED | COMMUNITY
Start: 2022-10-06 | End: 2024-03-28

## 2024-04-01 ENCOUNTER — APPOINTMENT (OUTPATIENT)
Dept: OBGYN | Facility: CLINIC | Age: 24
End: 2024-04-01
Payer: COMMERCIAL

## 2024-04-01 ENCOUNTER — ASOB RESULT (OUTPATIENT)
Age: 24
End: 2024-04-01

## 2024-04-01 VITALS
HEIGHT: 67 IN | DIASTOLIC BLOOD PRESSURE: 88 MMHG | HEART RATE: 80 BPM | BODY MASS INDEX: 45.99 KG/M2 | WEIGHT: 293 LBS | SYSTOLIC BLOOD PRESSURE: 126 MMHG

## 2024-04-01 DIAGNOSIS — N92.1 EXCESSIVE AND FREQUENT MENSTRUATION WITH IRREGULAR CYCLE: ICD-10-CM

## 2024-04-01 PROCEDURE — 76830 TRANSVAGINAL US NON-OB: CPT

## 2024-04-01 PROCEDURE — 76856 US EXAM PELVIC COMPLETE: CPT | Mod: 59

## 2024-04-01 PROCEDURE — 99395 PREV VISIT EST AGE 18-39: CPT

## 2024-04-01 RX ORDER — NORETHINDRONE AND ETHINYL ESTRADIOL 1 MG-35MCG
1-35 KIT ORAL
Qty: 84 | Refills: 3 | Status: ACTIVE | COMMUNITY
Start: 2024-04-01 | End: 1900-01-01

## 2024-04-01 NOTE — PLAN
[FreeTextEntry1] : Patient is 23-year-old  2 para 1-0-1-1 last menstrual period 2024 Patient presents for annual visit,, complaining of heavy and irregular cycles Physical exam reveals a well-developed well-nourished female no apparent distress,, morbidly obese,, BMI 52 Heart regular rhythm and rate, lungs clear, breast no mass nontender no skin change or nipple discharge no adenopathy, abdomen soft nontender no organomegaly Pelvic exam shows normal female external genitalia, vagina lesions, cervix appropriate size nontender, uterus adnexa unable to be assessed secondary to patient's body habitus Pap smear was performed Patient underwent a pelvic sonogram Uterus 6.51 x 6.85 x 4.98 Cervical length 3.4 cm Endometrial thickness 25.9 mm Both right and left ovary not able to be visualized No adnexal mass No free fluid Results discussed with patient Patient will be started on birth control pills with the first pill the first day of her next cycle Follow-up 6 months to monitor response or prior to that as needed  Marybeth was present as a chaperone for the entire assessment and examination of this patient

## 2024-04-01 NOTE — HISTORY OF PRESENT ILLNESS
[FreeTextEntry1] : Patient is a 23-year-old  2 para 1-0-1-1 last menstrual period 2024 Patient presents for annual visit complaining of irregular and heavy cycles

## 2024-04-05 LAB — CYTOLOGY CVX/VAG DOC THIN PREP: NORMAL

## 2024-04-15 RX ORDER — MEDROXYPROGESTERONE ACETATE 10 MG/1
10 TABLET ORAL
Qty: 10 | Refills: 3 | Status: ACTIVE | COMMUNITY
Start: 2024-04-15 | End: 1900-01-01

## 2024-04-29 ENCOUNTER — APPOINTMENT (OUTPATIENT)
Dept: OBGYN | Facility: CLINIC | Age: 24
End: 2024-04-29

## 2024-05-07 ENCOUNTER — ASOB RESULT (OUTPATIENT)
Age: 24
End: 2024-05-07

## 2024-05-07 ENCOUNTER — APPOINTMENT (OUTPATIENT)
Dept: OBGYN | Facility: CLINIC | Age: 24
End: 2024-05-07
Payer: COMMERCIAL

## 2024-05-07 VITALS
HEIGHT: 67 IN | SYSTOLIC BLOOD PRESSURE: 115 MMHG | DIASTOLIC BLOOD PRESSURE: 75 MMHG | HEART RATE: 90 BPM | WEIGHT: 293 LBS | BODY MASS INDEX: 45.99 KG/M2

## 2024-05-07 DIAGNOSIS — N93.9 ABNORMAL UTERINE AND VAGINAL BLEEDING, UNSPECIFIED: ICD-10-CM

## 2024-05-07 DIAGNOSIS — Z12.4 ENCOUNTER FOR SCREENING FOR MALIGNANT NEOPLASM OF CERVIX: ICD-10-CM

## 2024-05-07 DIAGNOSIS — Z12.39 ENCOUNTER FOR OTHER SCREENING FOR MALIGNANT NEOPLASM OF BREAST: ICD-10-CM

## 2024-05-07 DIAGNOSIS — Z11.51 ENCOUNTER FOR SCREENING FOR HUMAN PAPILLOMAVIRUS (HPV): ICD-10-CM

## 2024-05-07 DIAGNOSIS — Z01.419 ENCOUNTER FOR GYNECOLOGICAL EXAMINATION (GENERAL) (ROUTINE) W/OUT ABNORMAL FINDINGS: ICD-10-CM

## 2024-05-07 DIAGNOSIS — Z11.3 ENCOUNTER FOR SCREENING FOR INFECTIONS WITH A PREDOMINANTLY SEXUAL MODE OF TRANSMISSION: ICD-10-CM

## 2024-05-07 PROCEDURE — 76856 US EXAM PELVIC COMPLETE: CPT | Mod: 59

## 2024-05-07 PROCEDURE — 99212 OFFICE O/P EST SF 10 MIN: CPT | Mod: 25

## 2024-05-07 PROCEDURE — 76830 TRANSVAGINAL US NON-OB: CPT

## 2024-05-07 RX ORDER — NORETHINDRONE ACETATE 5 MG/1
5 TABLET ORAL
Qty: 35 | Refills: 0 | Status: ACTIVE | COMMUNITY
Start: 2024-05-07 | End: 1900-01-01

## 2024-05-07 NOTE — PLAN
[FreeTextEntry1] : Patient is a 23-year-old  2 para 1-0-1-1 last menstrual period 2024 Patient presents for relation with history of bleeding since her last cycle on and off with nonstop Patient failed to respond to Provera for 10 days and then to initiate her birth control pills Pelvic sonogram Uterus 6.78 x 6.15 x 4.94 Cervical length 3.26 cm Endometrial thickness 13.4 mm Cervix not visualized No adnexal masses No free fluid Results discussed with patient Reassured Patient will be sent for blood work for CBC, thyroid function test, FSH, LH, prolactin Patient will be treated with Aygestin 5 mg twice a day for 2 weeks followed by Aygestin 5 mg daily for 1 week and after that on her first day of her bleed will be initiated back on her birth control pills Instructions given Patient states she understands  Patient was seen in the office consultation room for discussion of the results and not examined during this visit

## 2024-05-07 NOTE — HISTORY OF PRESENT ILLNESS
[FreeTextEntry1] : Patient 23-year-old  2 para 1-0-1-1 last menstrual period 2024 Patient presents for evaluation with history of bleeding since her last cycle 1 from nonstop

## 2024-05-20 ENCOUNTER — APPOINTMENT (OUTPATIENT)
Dept: OBGYN | Facility: CLINIC | Age: 24
End: 2024-05-20

## 2024-06-03 ENCOUNTER — APPOINTMENT (OUTPATIENT)
Dept: OBGYN | Facility: CLINIC | Age: 24
End: 2024-06-03
Payer: COMMERCIAL

## 2024-06-03 VITALS
BODY MASS INDEX: 45.99 KG/M2 | DIASTOLIC BLOOD PRESSURE: 80 MMHG | WEIGHT: 293 LBS | HEIGHT: 67 IN | SYSTOLIC BLOOD PRESSURE: 127 MMHG | HEART RATE: 84 BPM

## 2024-06-03 DIAGNOSIS — Z01.419 ENCOUNTER FOR GYNECOLOGICAL EXAMINATION (GENERAL) (ROUTINE) W/OUT ABNORMAL FINDINGS: ICD-10-CM

## 2024-06-03 DIAGNOSIS — Z11.3 ENCOUNTER FOR SCREENING FOR INFECTIONS WITH A PREDOMINANTLY SEXUAL MODE OF TRANSMISSION: ICD-10-CM

## 2024-06-03 PROCEDURE — 99212 OFFICE O/P EST SF 10 MIN: CPT

## 2024-06-03 RX ORDER — NORETHINDRONE ACETATE AND ETHINYL ESTRADIOL AND FERROUS FUMARATE 1MG-20(24)
1-20 KIT ORAL
Qty: 3 | Refills: 3 | Status: DISCONTINUED | COMMUNITY
Start: 2023-01-31 | End: 2024-06-03

## 2024-06-03 RX ORDER — NORETHINDRONE AND ETHINYL ESTRADIOL 0.4-0.035
0.4-35 KIT ORAL
Qty: 3 | Refills: 8 | Status: ACTIVE | COMMUNITY
Start: 2024-04-26 | End: 1900-01-01

## 2024-06-03 RX ORDER — METRONIDAZOLE 7.5 MG/G
0.75 GEL VAGINAL
Qty: 1 | Refills: 0 | Status: DISCONTINUED | COMMUNITY
Start: 2023-02-21 | End: 2024-06-03

## 2024-06-03 NOTE — HISTORY OF PRESENT ILLNESS
[FreeTextEntry1] : Patient 23-year-old  2 para 1-0-1-1 last menstrual period May 25, 2024 Patient presents for follow-up after being seen in the emergency room at [facility] and received 2 units of packed RBCs for severe anemia with a hemoglobin of 6.5

## 2024-06-03 NOTE — PLAN
[FreeTextEntry1] : Patient 23-year-old  2 para 1-0-1-1 last menstrual period May 25, 2024 Patient presents for follow-up after being seen at with [facility] emergency room and transfused 2 units of packed RBCs for hemoglobin of 6.5 Patient has history of severe menorrhagia has been treated with Provera and also was placed on birth control pills Patient states she has responded to the birth control pills with a normal cycle on May 25 for approximately altogether 7 days Patient will be continued on the birth control pills Patient states she has follow-up appointment with her primary care doctor and have repeat blood work to monitor her anemia Questions answered Patient states she understands Follow-up 6 months  Patient was seen in the office consultation for discussion and not examined during this visit and patient's young daughter was also present in the room

## 2024-08-06 PROBLEM — N76.0 ACUTE VAGINITIS: Status: ACTIVE | Noted: 2023-02-13

## 2024-08-30 ENCOUNTER — APPOINTMENT (OUTPATIENT)
Dept: OBGYN | Facility: CLINIC | Age: 24
End: 2024-08-30
Payer: COMMERCIAL

## 2024-08-30 ENCOUNTER — NON-APPOINTMENT (OUTPATIENT)
Age: 24
End: 2024-08-30

## 2024-08-30 VITALS
SYSTOLIC BLOOD PRESSURE: 109 MMHG | BODY MASS INDEX: 45.99 KG/M2 | DIASTOLIC BLOOD PRESSURE: 80 MMHG | HEIGHT: 67 IN | HEART RATE: 94 BPM | WEIGHT: 293 LBS

## 2024-08-30 DIAGNOSIS — N93.9 ABNORMAL UTERINE AND VAGINAL BLEEDING, UNSPECIFIED: ICD-10-CM

## 2024-08-30 DIAGNOSIS — N91.1 SECONDARY AMENORRHEA: ICD-10-CM

## 2024-08-30 LAB
HCG UR QL: POSITIVE
QUALITY CONTROL: YES

## 2024-08-30 PROCEDURE — 99213 OFFICE O/P EST LOW 20 MIN: CPT | Mod: 25

## 2024-08-30 PROCEDURE — 99459 PELVIC EXAMINATION: CPT

## 2024-08-30 PROCEDURE — 81025 URINE PREGNANCY TEST: CPT

## 2024-08-30 RX ORDER — MULTIVIT-MIN69/IRON/FOLIC ACID 50-1.25 MG
50-1.25 TABLET ORAL DAILY
Qty: 30 | Refills: 6 | Status: ACTIVE | COMMUNITY
Start: 2024-08-30 | End: 1900-01-01

## 2024-08-30 RX ORDER — FOLIC ACID 1 MG/1
1 TABLET ORAL DAILY
Qty: 90 | Refills: 3 | Status: ACTIVE | COMMUNITY
Start: 2024-08-30 | End: 1900-01-01

## 2024-08-30 NOTE — HISTORY OF PRESENT ILLNESS
[FreeTextEntry1] : Patient is a 23-year-old  3 para 1-0-1-1 last menstrual period 2024 Patient presents for evaluation complaining of vaginal irritation and itching for the past 2 weeks and missing a cycle

## 2024-08-30 NOTE — PHYSICAL EXAM
[Chaperone Present] : A chaperone was present in the examining room during all aspects of the physical examination [28121] : A chaperone was present during the pelvic exam. [FreeTextEntry2] : Gi [Labia Majora] : normal [Labia Minora] : normal [Discharge] : a  ~M vaginal discharge was present [Scant] : scant [Foul Smelling] : not foul smelling [White] : white [Thin] : thin [Normal] : normal [Tenderness] : nontender [Anteversion] : anteverted [Mass ___ cm] : no uterine mass was palpated [Uterine Adnexae] : normal [FreeTextEntry5] : No CMT

## 2024-08-30 NOTE — PLAN
[FreeTextEntry1] : Patient is a 23-year-old  3 para 1-0-1-1 last menstrual period 2024 Patient presents for initial complaining of vaginal irritation and itching over the past 2 weeks and also not having a cycle since  Physical exam reveals a well-developed well-nourished female in no apparent distress,, morbidly obese,, BMI approximately 50 Exam shows normal female external genitalia, vagina lesions, evidence of a thin white discharge, cervix appropriate size no cervical motion tenderness, uterus nontender adnexa nontender but difficult to assess secondary to patient's body habitus Vaginal culture was performed Urine pregnancy test is positive Will await culture results prior to initiating treatment since patient appears to be in the first trimester Follow-up 2 weeks for new OB dating sonogram and to initiate prenatal care Questions answered Patient states she understands  Gi was present as a chaperone for the entire assessment and examination of this patient

## 2024-09-06 LAB
A VAGINAE DNA VAG QL NAA+PROBE: ABNORMAL
BVAB2 DNA VAG QL NAA+PROBE: ABNORMAL
C KRUSEI DNA VAG QL NAA+PROBE: NEGATIVE
C KRUSEI DNA VAG QL NAA+PROBE: POSITIVE
C TRACH RRNA SPEC QL NAA+PROBE: NEGATIVE
CANDIDA DNA VAG QL NAA+PROBE: NEGATIVE
MEGA1 DNA VAG QL NAA+PROBE: ABNORMAL
N GONORRHOEA RRNA SPEC QL NAA+PROBE: NEGATIVE
T VAGINALIS RRNA SPEC QL NAA+PROBE: NEGATIVE

## 2024-09-06 RX ORDER — METRONIDAZOLE 7.5 MG/G
0.75 GEL VAGINAL
Qty: 1 | Refills: 1 | Status: ACTIVE | COMMUNITY
Start: 2024-09-06 | End: 1900-01-01

## 2024-09-17 ENCOUNTER — NON-APPOINTMENT (OUTPATIENT)
Age: 24
End: 2024-09-17

## 2024-09-17 ENCOUNTER — ASOB RESULT (OUTPATIENT)
Age: 24
End: 2024-09-17

## 2024-09-17 ENCOUNTER — APPOINTMENT (OUTPATIENT)
Dept: OBGYN | Facility: CLINIC | Age: 24
End: 2024-09-17
Payer: COMMERCIAL

## 2024-09-17 VITALS
WEIGHT: 293 LBS | BODY MASS INDEX: 45.99 KG/M2 | HEART RATE: 85 BPM | DIASTOLIC BLOOD PRESSURE: 81 MMHG | HEIGHT: 67 IN | SYSTOLIC BLOOD PRESSURE: 115 MMHG

## 2024-09-17 DIAGNOSIS — Z11.4 ENCOUNTER FOR SCREENING FOR HUMAN IMMUNODEFICIENCY VIRUS [HIV]: ICD-10-CM

## 2024-09-17 DIAGNOSIS — Z11.59 ENCOUNTER FOR SCREENING FOR OTHER VIRAL DISEASES: ICD-10-CM

## 2024-09-17 DIAGNOSIS — Z13.0 ENCOUNTER FOR SCREENING FOR DISEASES OF THE BLOOD AND BLOOD-FORMING ORGANS AND CERTAIN DISORDERS INVOLVING THE IMMUNE MECHANISM: ICD-10-CM

## 2024-09-17 DIAGNOSIS — O99.210 OBESITY COMPLICATING PREGNANCY, UNSPECIFIED TRIMESTER: ICD-10-CM

## 2024-09-17 DIAGNOSIS — Z13.1 ENCOUNTER FOR SCREENING FOR DIABETES MELLITUS: ICD-10-CM

## 2024-09-17 DIAGNOSIS — N76.0 ACUTE VAGINITIS: ICD-10-CM

## 2024-09-17 DIAGNOSIS — Z34.90 ENCOUNTER FOR SUPERVISION OF NORMAL PREGNANCY, UNSPECIFIED, UNSPECIFIED TRIMESTER: ICD-10-CM

## 2024-09-17 DIAGNOSIS — Z13.71 ENCOUNTER FOR NONPROCREATIVE SCREENING FOR GENETIC DISEASE CARRIER STATUS: ICD-10-CM

## 2024-09-17 DIAGNOSIS — A60.00 HERPESVIRAL INFECTION OF UROGENITAL SYSTEM, UNSPECIFIED: ICD-10-CM

## 2024-09-17 DIAGNOSIS — Z13.29 ENCOUNTER FOR SCREENING FOR OTHER SUSPECTED ENDOCRINE DISORDER: ICD-10-CM

## 2024-09-17 DIAGNOSIS — E66.01 OBESITY COMPLICATING PREGNANCY, UNSPECIFIED TRIMESTER: ICD-10-CM

## 2024-09-17 DIAGNOSIS — Z11.3 ENCOUNTER FOR SCREENING FOR INFECTIONS WITH A PREDOMINANTLY SEXUAL MODE OF TRANSMISSION: ICD-10-CM

## 2024-09-17 DIAGNOSIS — Z30.40 ENCOUNTER FOR SURVEILLANCE OF CONTRACEPTIVES, UNSPECIFIED: ICD-10-CM

## 2024-09-17 DIAGNOSIS — Z98.891 HISTORY OF UTERINE SCAR FROM PREVIOUS SURGERY: ICD-10-CM

## 2024-09-17 LAB
BILIRUB UR QL STRIP: NEGATIVE
CLARITY UR: CLEAR
COLLECTION METHOD: NORMAL
GLUCOSE UR-MCNC: NEGATIVE
HCG UR QL: 0.2 EU/DL
HGB UR QL STRIP.AUTO: NEGATIVE
KETONES UR-MCNC: NEGATIVE
LEUKOCYTE ESTERASE UR QL STRIP: NEGATIVE
NITRITE UR QL STRIP: NEGATIVE
PH UR STRIP: 7.5
PROT UR STRIP-MCNC: NORMAL
SP GR UR STRIP: 1.02

## 2024-09-17 PROCEDURE — 76817 TRANSVAGINAL US OBSTETRIC: CPT | Mod: 59

## 2024-09-17 PROCEDURE — 0500F INITIAL PRENATAL CARE VISIT: CPT

## 2024-09-17 PROCEDURE — 81002 URINALYSIS NONAUTO W/O SCOPE: CPT | Mod: NC

## 2024-09-17 PROCEDURE — 76801 OB US < 14 WKS SINGLE FETUS: CPT

## 2024-09-17 RX ORDER — PRENATAL VIT 49/IRON FUM/FOLIC 6.75-0.2MG
TABLET ORAL
Refills: 0 | Status: ACTIVE | COMMUNITY

## 2024-09-24 NOTE — OB RN INTRAOPERATIVE NOTE - NS_UTERINEINCISION_OBGYN_ALL_OB_DT
1st trial renewal - after this, patient will be in their 6 month waiting period. They will be able to start their 2nd and final trial on or after 09/11/2025 or can be switched to another medication if their BMI is 30 or higher. If you have any questions on trials or wait time please look at the [facility] Handbook for more information Topic # 6042     08-Mar-2022 10:53

## 2024-09-27 ENCOUNTER — NON-APPOINTMENT (OUTPATIENT)
Age: 24
End: 2024-09-27

## 2024-09-27 ENCOUNTER — APPOINTMENT (OUTPATIENT)
Dept: OBGYN | Facility: CLINIC | Age: 24
End: 2024-09-27
Payer: COMMERCIAL

## 2024-09-27 VITALS
DIASTOLIC BLOOD PRESSURE: 81 MMHG | BODY MASS INDEX: 45.99 KG/M2 | WEIGHT: 293 LBS | HEIGHT: 67 IN | HEART RATE: 90 BPM | SYSTOLIC BLOOD PRESSURE: 131 MMHG

## 2024-09-27 PROCEDURE — 81002 URINALYSIS NONAUTO W/O SCOPE: CPT | Mod: NC

## 2024-09-27 PROCEDURE — 0502F SUBSEQUENT PRENATAL CARE: CPT

## 2024-10-04 DIAGNOSIS — N76.0 ACUTE VAGINITIS: ICD-10-CM

## 2024-10-04 RX ORDER — METRONIDAZOLE 7.5 MG/G
0.75 GEL VAGINAL
Qty: 1 | Refills: 1 | Status: ACTIVE | COMMUNITY
Start: 2024-10-04 | End: 1900-01-01

## 2024-10-07 ENCOUNTER — ASOB RESULT (OUTPATIENT)
Age: 24
End: 2024-10-07

## 2024-10-07 ENCOUNTER — APPOINTMENT (OUTPATIENT)
Dept: MATERNAL FETAL MEDICINE | Facility: CLINIC | Age: 24
End: 2024-10-07
Payer: COMMERCIAL

## 2024-10-07 PROCEDURE — 99212 OFFICE O/P EST SF 10 MIN: CPT

## 2024-10-09 NOTE — OB RN TRIAGE NOTE - NS_PRENATALSTART_OBGYN_ALL_OB
----- Message from Fay Hernandez PA-C sent at 9/18/2018  4:31 PM CDT -----  Please inform patient that blood count, electrolytes, thyroid and vitamin b 12 are all normal. However her bad cholesterol is very high. In fact the highest it has ever been. I would strongly recommend starting a cholesterol lowering medication to reduce her risk of cardiovascular events including stroke and heart attack. [facility] [place]    
Contacted patient with results and recommendations.  Verbalized understanding. No questions at this time.     Patient would like to wait and recheck lipid panel before starting medications.  Patient would also like to discuss with her .     After review with Fay Hernandez PA-C, repeat lipid is placed for patient to complete.   
Epidural Block    Patient location during procedure: OB  Reason for block: labor epidural  Staffing  Performed: resident/CRNA   Resident/CRNA: Ruy Gil APRN - CRNA  Performed by: Ruy Gil APRN - CHRISTIAN  Authorized by: Fab Aguilar MD    Epidural  Patient position: sitting  Prep: Betasept and site prepped and draped  Patient monitoring: continuous pulse ox and frequent blood pressure checks  Approach: midline  Location: L2-3  Injection technique: RACHELLE saline and RACHELLE air  Provider prep: mask and sterile gloves  Needle  Needle type: Tuohy   Needle gauge: 17 G  Needle length: 3.5 in  Needle insertion depth: 6 cm  Catheter type: side hole  Catheter size: 19 G  Catheter at skin depth: 14 cm  Test dose: negativeCatheter Secured: tegaderm and tape  Assessment  Hemodynamics: stable  Attempts: 2  Outcomes: uncomplicated and patient tolerated procedure well  Preanesthetic Checklist  Completed: patient identified, IV checked, site marked, risks and benefits discussed, surgical/procedural consents, equipment checked, pre-op evaluation, timeout performed, anesthesia consent given, oxygen available, monitors applied/VS acknowledged, fire risk safety assessment completed and verbalized and blood product R/B/A discussed and consented          
Left detailed message for patient to call clinic back.   
Patient returned call regarding below    
Started first trimester

## 2024-10-14 ENCOUNTER — APPOINTMENT (OUTPATIENT)
Dept: ANTEPARTUM | Facility: CLINIC | Age: 24
End: 2024-10-14
Payer: COMMERCIAL

## 2024-10-14 ENCOUNTER — ASOB RESULT (OUTPATIENT)
Age: 24
End: 2024-10-14

## 2024-10-14 ENCOUNTER — NON-APPOINTMENT (OUTPATIENT)
Age: 24
End: 2024-10-14

## 2024-10-14 ENCOUNTER — LABORATORY RESULT (OUTPATIENT)
Age: 24
End: 2024-10-14

## 2024-10-14 PROCEDURE — 76813 OB US NUCHAL MEAS 1 GEST: CPT

## 2024-10-14 PROCEDURE — 36415 COLL VENOUS BLD VENIPUNCTURE: CPT

## 2024-10-15 ENCOUNTER — APPOINTMENT (OUTPATIENT)
Dept: OBGYN | Facility: CLINIC | Age: 24
End: 2024-10-15
Payer: COMMERCIAL

## 2024-10-15 ENCOUNTER — NON-APPOINTMENT (OUTPATIENT)
Age: 24
End: 2024-10-15

## 2024-10-15 VITALS
BODY MASS INDEX: 45.99 KG/M2 | DIASTOLIC BLOOD PRESSURE: 80 MMHG | SYSTOLIC BLOOD PRESSURE: 131 MMHG | HEIGHT: 67 IN | HEART RATE: 82 BPM | WEIGHT: 293 LBS

## 2024-10-15 DIAGNOSIS — Z11.59 ENCOUNTER FOR SCREENING FOR OTHER VIRAL DISEASES: ICD-10-CM

## 2024-10-15 DIAGNOSIS — Z11.4 ENCOUNTER FOR SCREENING FOR HUMAN IMMUNODEFICIENCY VIRUS [HIV]: ICD-10-CM

## 2024-10-15 DIAGNOSIS — Z13.29 ENCOUNTER FOR SCREENING FOR OTHER SUSPECTED ENDOCRINE DISORDER: ICD-10-CM

## 2024-10-15 DIAGNOSIS — Z13.0 ENCOUNTER FOR SCREENING FOR DISEASES OF THE BLOOD AND BLOOD-FORMING ORGANS AND CERTAIN DISORDERS INVOLVING THE IMMUNE MECHANISM: ICD-10-CM

## 2024-10-15 DIAGNOSIS — Z11.3 ENCOUNTER FOR SCREENING FOR INFECTIONS WITH A PREDOMINANTLY SEXUAL MODE OF TRANSMISSION: ICD-10-CM

## 2024-10-15 DIAGNOSIS — N76.0 ACUTE VAGINITIS: ICD-10-CM

## 2024-10-15 DIAGNOSIS — Z13.1 ENCOUNTER FOR SCREENING FOR DIABETES MELLITUS: ICD-10-CM

## 2024-10-15 DIAGNOSIS — Z13.71 ENCOUNTER FOR NONPROCREATIVE SCREENING FOR GENETIC DISEASE CARRIER STATUS: ICD-10-CM

## 2024-10-15 PROBLEM — Z3A.12 12 WEEKS GESTATION OF PREGNANCY: Status: ACTIVE | Noted: 2024-10-15

## 2024-10-15 LAB
BILIRUB UR QL STRIP: NEGATIVE
CLARITY UR: CLEAR
COLLECTION METHOD: NORMAL
GLUCOSE UR-MCNC: NEGATIVE
HBV SURFACE AG SER QL: NONREACTIVE
HCG UR QL: 0.2 EU/DL
HCV AB SER QL: NONREACTIVE
HCV S/CO RATIO: 0.09 S/CO
HGB UR QL STRIP.AUTO: NEGATIVE
KETONES UR-MCNC: NEGATIVE
LEUKOCYTE ESTERASE UR QL STRIP: NEGATIVE
NITRITE UR QL STRIP: NEGATIVE
PH UR STRIP: 6.5
PROT UR STRIP-MCNC: NEGATIVE
SP GR UR STRIP: 1.03

## 2024-10-15 PROCEDURE — 81002 URINALYSIS NONAUTO W/O SCOPE: CPT | Mod: NC

## 2024-10-15 PROCEDURE — 0502F SUBSEQUENT PRENATAL CARE: CPT

## 2024-10-21 ENCOUNTER — NON-APPOINTMENT (OUTPATIENT)
Age: 24
End: 2024-10-21

## 2024-10-21 ENCOUNTER — ASOB RESULT (OUTPATIENT)
Age: 24
End: 2024-10-21

## 2024-10-21 ENCOUNTER — APPOINTMENT (OUTPATIENT)
Dept: MATERNAL FETAL MEDICINE | Facility: CLINIC | Age: 24
End: 2024-10-21
Payer: COMMERCIAL

## 2024-10-21 PROCEDURE — 99212 OFFICE O/P EST SF 10 MIN: CPT | Mod: 95

## 2024-10-24 DIAGNOSIS — O28.1 ABNORMAL BIOCHEMICAL FINDING ON ANTENATAL SCREENING OF MOTHER: ICD-10-CM

## 2024-10-24 DIAGNOSIS — Z3A.12 12 WEEKS GESTATION OF PREGNANCY: ICD-10-CM

## 2024-10-24 DIAGNOSIS — Z11.59 ENCOUNTER FOR SCREENING FOR OTHER VIRAL DISEASES: ICD-10-CM

## 2024-10-24 DIAGNOSIS — B00.9 HERPESVIRAL INFECTION, UNSPECIFIED: ICD-10-CM

## 2024-11-11 ENCOUNTER — APPOINTMENT (OUTPATIENT)
Dept: ANTEPARTUM | Facility: CLINIC | Age: 24
End: 2024-11-11
Payer: COMMERCIAL

## 2024-11-11 PROCEDURE — 36415 COLL VENOUS BLD VENIPUNCTURE: CPT

## 2024-11-12 ENCOUNTER — NON-APPOINTMENT (OUTPATIENT)
Age: 24
End: 2024-11-12

## 2024-11-12 ENCOUNTER — APPOINTMENT (OUTPATIENT)
Dept: OBGYN | Facility: CLINIC | Age: 24
End: 2024-11-12
Payer: COMMERCIAL

## 2024-11-12 VITALS
BODY MASS INDEX: 45.99 KG/M2 | SYSTOLIC BLOOD PRESSURE: 105 MMHG | DIASTOLIC BLOOD PRESSURE: 71 MMHG | HEIGHT: 67 IN | WEIGHT: 293 LBS | RESPIRATION RATE: 16 BRPM | HEART RATE: 89 BPM

## 2024-11-12 DIAGNOSIS — Z3A.16 16 WEEKS GESTATION OF PREGNANCY: ICD-10-CM

## 2024-11-12 PROCEDURE — 0502F SUBSEQUENT PRENATAL CARE: CPT

## 2024-11-12 PROCEDURE — 81003 URINALYSIS AUTO W/O SCOPE: CPT | Mod: NC,QW

## 2024-11-13 LAB
1ST TRIMESTER SCN+NT PATIENT-IMP: NORMAL
AFP INTERP SERPL-IMP: NORMAL
AFP INTERP SERPL-IMP: NORMAL
AFP MOM CUT-OFF: 2.5
AFP MOM SERPL: 0.92
AFP PERCENTILE: 39.7
AFP SERPL-ACNC: 23.54 NG/ML
AGE AT DELIVERY: 26.4
B-HCG FREE MOM PRCTL SERPL: 42.5
B-HCG FREE MOM SERPL: 0.89
B-HCG FREE SERPL-MCNC: 6.8 NG/ML
BILIRUB UR QL STRIP: NORMAL
CARBAMAZEPINE?: NO
CLARITY UR: CLEAR
COLLECT DATE: NORMAL
COLLECTION METHOD: NORMAL
CURRENT SMOKER: NO
DIABETES STATUS PATIENT: NO
FET CRL US.MEAS: 75.5 MM
FET NASAL BN: NORMAL
FET NUCHAL FOLD MOM THICKNESS US.MEAS: 1.9 MM
FET TS 18 PRIOR RISK FROM MAT AGE: NORMAL
FET TS 21 RISK FROM MAT AGE: NORMAL
GA: NORMAL
GLUCOSE UR-MCNC: NORMAL
HCG UR QL: 0.2 EU/DL
HGB UR QL STRIP.AUTO: NORMAL
HX OF NTD NARR: NO
HX OF TRISOMY 21 QL: NO
INHIBIN A ADJ MOM SERPL: 1.74
INHIBIN PERCENTILE: 91.3
INHIBIN SERPL-MCNC: 198.8 PG/ML
KETONES UR-MCNC: NORMAL
LEUKOCYTE ESTERASE UR QL STRIP: NORMAL
MATERNAL RISK FACTORS: NORMAL
MULTIPLE PREGNANCY: NORMAL
NEURAL TUBE DEFECT RISK FETUS: NORMAL
NEURAL TUBE DEFECT RISK POP: NORMAL
NITRITE UR QL STRIP: NORMAL
NUCHAL  TRANSLUCENCY  MOM: 1.09
PAPP-A ADJ MOM SERPL: 0.41
PAPP-A MOM PRCTL SERPL: 5.4
PAPP-A SERPL-ACNC: 1156 MIU/L
PH UR STRIP: 6.5
PROT UR STRIP-MCNC: NORMAL
RECOM F/U: NO
SP GR UR STRIP: 1.03
TEST PERFORMANCE INFO SPEC: NORMAL
TRISOMY 18+13 RISK FETUS: NORMAL
TS 21 RISK CTO FETUS: NORMAL
TS 21 RISK FETUS: NORMAL
U ESTRIOL ADJ MOM SERPL: 0.84
U ESTRIOL SERPL-SCNC: 1.61 NMOL/L
UNCONJUGATED ESTRIOL PERCENTILE: 37
US DATE: NORMAL
VALPROIC ACID?: NORMAL

## 2024-12-09 ENCOUNTER — ASOB RESULT (OUTPATIENT)
Age: 24
End: 2024-12-09

## 2024-12-09 ENCOUNTER — APPOINTMENT (OUTPATIENT)
Dept: ANTEPARTUM | Facility: CLINIC | Age: 24
End: 2024-12-09
Payer: COMMERCIAL

## 2024-12-09 PROCEDURE — 76817 TRANSVAGINAL US OBSTETRIC: CPT

## 2024-12-09 PROCEDURE — 76811 OB US DETAILED SNGL FETUS: CPT

## 2024-12-10 ENCOUNTER — APPOINTMENT (OUTPATIENT)
Dept: OBGYN | Facility: CLINIC | Age: 24
End: 2024-12-10
Payer: COMMERCIAL

## 2024-12-10 ENCOUNTER — NON-APPOINTMENT (OUTPATIENT)
Age: 24
End: 2024-12-10

## 2024-12-10 VITALS
RESPIRATION RATE: 16 BRPM | HEIGHT: 67 IN | BODY MASS INDEX: 45.99 KG/M2 | WEIGHT: 293 LBS | HEART RATE: 87 BPM | DIASTOLIC BLOOD PRESSURE: 82 MMHG | SYSTOLIC BLOOD PRESSURE: 124 MMHG

## 2024-12-10 DIAGNOSIS — Z3A.20 20 WEEKS GESTATION OF PREGNANCY: ICD-10-CM

## 2024-12-10 DIAGNOSIS — Z3A.16 16 WEEKS GESTATION OF PREGNANCY: ICD-10-CM

## 2024-12-10 LAB
BILIRUB UR QL STRIP: NEGATIVE
GLUCOSE UR-MCNC: NEGATIVE
HCG UR QL: 0.2 EU/DL
HGB UR QL STRIP.AUTO: NEGATIVE
KETONES UR-MCNC: NEGATIVE
LEUKOCYTE ESTERASE UR QL STRIP: NEGATIVE
NITRITE UR QL STRIP: NEGATIVE
PH UR STRIP: 6.5
PROT UR STRIP-MCNC: NEGATIVE
SP GR UR STRIP: 1.02

## 2024-12-10 PROCEDURE — 0502F SUBSEQUENT PRENATAL CARE: CPT

## 2024-12-10 PROCEDURE — 81002 URINALYSIS NONAUTO W/O SCOPE: CPT | Mod: NC

## 2024-12-10 PROCEDURE — 59425 ANTEPARTUM CARE ONLY: CPT

## 2024-12-18 ENCOUNTER — APPOINTMENT (OUTPATIENT)
Dept: ANTEPARTUM | Facility: CLINIC | Age: 24
End: 2024-12-18
Payer: COMMERCIAL

## 2024-12-18 ENCOUNTER — ASOB RESULT (OUTPATIENT)
Age: 24
End: 2024-12-18

## 2024-12-18 DIAGNOSIS — O35.9XX0 MATERNAL CARE FOR (SUSPECTED) FETAL ABNORMALITY AND DAMAGE, UNSPECIFIED, NOT APPLICABLE OR UNSPECIFIED: ICD-10-CM

## 2024-12-18 PROCEDURE — 76816 OB US FOLLOW-UP PER FETUS: CPT

## 2024-12-19 ENCOUNTER — NON-APPOINTMENT (OUTPATIENT)
Age: 24
End: 2024-12-19

## 2025-01-06 ENCOUNTER — NON-APPOINTMENT (OUTPATIENT)
Age: 25
End: 2025-01-06

## 2025-01-07 ENCOUNTER — NON-APPOINTMENT (OUTPATIENT)
Age: 25
End: 2025-01-07

## 2025-01-07 ENCOUNTER — APPOINTMENT (OUTPATIENT)
Dept: OBGYN | Facility: CLINIC | Age: 25
End: 2025-01-07
Payer: COMMERCIAL

## 2025-01-07 VITALS
RESPIRATION RATE: 16 BRPM | HEIGHT: 67 IN | DIASTOLIC BLOOD PRESSURE: 79 MMHG | BODY MASS INDEX: 45.99 KG/M2 | HEART RATE: 85 BPM | WEIGHT: 293 LBS | SYSTOLIC BLOOD PRESSURE: 122 MMHG

## 2025-01-07 DIAGNOSIS — Z3A.20 20 WEEKS GESTATION OF PREGNANCY: ICD-10-CM

## 2025-01-07 DIAGNOSIS — Z13.0 ENCOUNTER FOR SCREENING FOR DISEASES OF THE BLOOD AND BLOOD-FORMING ORGANS AND CERTAIN DISORDERS INVOLVING THE IMMUNE MECHANISM: ICD-10-CM

## 2025-01-07 DIAGNOSIS — Z13.1 ENCOUNTER FOR SCREENING FOR DIABETES MELLITUS: ICD-10-CM

## 2025-01-07 PROCEDURE — 81002 URINALYSIS NONAUTO W/O SCOPE: CPT | Mod: NC

## 2025-01-07 PROCEDURE — 0502F SUBSEQUENT PRENATAL CARE: CPT

## 2025-01-09 ENCOUNTER — APPOINTMENT (OUTPATIENT)
Dept: PEDIATRIC CARDIOLOGY | Facility: CLINIC | Age: 25
End: 2025-01-09
Payer: COMMERCIAL

## 2025-01-09 DIAGNOSIS — O99.210 OBESITY COMPLICATING PREGNANCY, UNSPECIFIED TRIMESTER: ICD-10-CM

## 2025-01-09 DIAGNOSIS — E66.01 OBESITY COMPLICATING PREGNANCY, UNSPECIFIED TRIMESTER: ICD-10-CM

## 2025-01-09 PROCEDURE — 76825 ECHO EXAM OF FETAL HEART: CPT

## 2025-01-09 PROCEDURE — 76821 MIDDLE CEREBRAL ARTERY ECHO: CPT

## 2025-01-09 PROCEDURE — 76820 UMBILICAL ARTERY ECHO: CPT

## 2025-01-09 PROCEDURE — 93325 DOPPLER ECHO COLOR FLOW MAPG: CPT | Mod: 59

## 2025-01-09 PROCEDURE — 99203 OFFICE O/P NEW LOW 30 MIN: CPT | Mod: 25

## 2025-01-09 PROCEDURE — 76827 ECHO EXAM OF FETAL HEART: CPT

## 2025-01-15 ENCOUNTER — ASOB RESULT (OUTPATIENT)
Age: 25
End: 2025-01-15

## 2025-01-15 ENCOUNTER — APPOINTMENT (OUTPATIENT)
Dept: ANTEPARTUM | Facility: CLINIC | Age: 25
End: 2025-01-15
Payer: COMMERCIAL

## 2025-01-15 PROCEDURE — 76816 OB US FOLLOW-UP PER FETUS: CPT

## 2025-01-16 DIAGNOSIS — Z3A.24 24 WEEKS GESTATION OF PREGNANCY: ICD-10-CM

## 2025-01-16 DIAGNOSIS — O35.9XX0 MATERNAL CARE FOR (SUSPECTED) FETAL ABNORMALITY AND DAMAGE, UNSPECIFIED, NOT APPLICABLE OR UNSPECIFIED: ICD-10-CM

## 2025-01-16 DIAGNOSIS — O36.60X0 MATERNAL CARE FOR EXCESSIVE FETAL GROWTH, UNSPECIFIED TRIMESTER, NOT APPLICABLE OR UNSPECIFIED: ICD-10-CM

## 2025-02-03 ENCOUNTER — NON-APPOINTMENT (OUTPATIENT)
Age: 25
End: 2025-02-03

## 2025-02-04 ENCOUNTER — NON-APPOINTMENT (OUTPATIENT)
Age: 25
End: 2025-02-04

## 2025-02-04 ENCOUNTER — APPOINTMENT (OUTPATIENT)
Dept: OBGYN | Facility: CLINIC | Age: 25
End: 2025-02-04
Payer: COMMERCIAL

## 2025-02-04 VITALS
SYSTOLIC BLOOD PRESSURE: 117 MMHG | HEART RATE: 97 BPM | BODY MASS INDEX: 45.99 KG/M2 | WEIGHT: 293 LBS | DIASTOLIC BLOOD PRESSURE: 74 MMHG | HEIGHT: 67 IN | RESPIRATION RATE: 16 BRPM

## 2025-02-04 DIAGNOSIS — Z13.0 ENCOUNTER FOR SCREENING FOR DISEASES OF THE BLOOD AND BLOOD-FORMING ORGANS AND CERTAIN DISORDERS INVOLVING THE IMMUNE MECHANISM: ICD-10-CM

## 2025-02-04 DIAGNOSIS — R79.0 ABNORMAL LVL OF BLOOD MINERAL: ICD-10-CM

## 2025-02-04 DIAGNOSIS — Z3A.28 28 WEEKS GESTATION OF PREGNANCY: ICD-10-CM

## 2025-02-04 LAB
BILIRUB UR QL STRIP: NEGATIVE
GLUCOSE UR-MCNC: NEGATIVE
HCG UR QL: 0.2 EU/DL
HGB UR QL STRIP.AUTO: NEGATIVE
KETONES UR-MCNC: NEGATIVE
LEUKOCYTE ESTERASE UR QL STRIP: NEGATIVE
NITRITE UR QL STRIP: NEGATIVE
PH UR STRIP: 6.5
PROT UR STRIP-MCNC: NEGATIVE
SP GR UR STRIP: >=1.03

## 2025-02-04 PROCEDURE — 0502F SUBSEQUENT PRENATAL CARE: CPT

## 2025-02-04 PROCEDURE — 81002 URINALYSIS NONAUTO W/O SCOPE: CPT

## 2025-02-12 ENCOUNTER — APPOINTMENT (OUTPATIENT)
Dept: ANTEPARTUM | Facility: CLINIC | Age: 25
End: 2025-02-12
Payer: COMMERCIAL

## 2025-02-12 ENCOUNTER — ASOB RESULT (OUTPATIENT)
Age: 25
End: 2025-02-12

## 2025-02-12 PROCEDURE — 76816 OB US FOLLOW-UP PER FETUS: CPT

## 2025-02-12 NOTE — OB PST NOTE - WEIGHT METHOD
February 13, 2025       Urban Galvin MD  [address]  [address]  [facility] [address]  Via In Basket      Patient: Michael Degroot Jr.   YOB: 1951   Date of Visit: 2/12/2025       Dear Dr. Galvin:    Thank you for referring Michael Degroot to me for evaluation. Below are my notes for this visit with him.    If you have questions, please do not hesitate to call me. I look forward to following your patient along with you.      Sincerely,        Kenny Maynard MD        CC: No Recipients  Kenny Maynard MD  2/13/2025  8:26 AM  Signed  Patient ID: Michael is a 74 year old male.    Chief Complaint   Patient presents with   • Office Visit       HPI:         Michael is a  74 year old male here for follow-up evaluation of his CAD s/p coronary stenting in 2000 and 2003. He underwent a cardiac catheterization in 2006 and successful percutaneous transluminal coronary angioplasty in 2006. He has a hx of SSS s/p VVIR pacemaker placement in 04/2016, Dyslipidemia, DM, and HTN. He also has a hx of chronic atrial fibrillation    He apparently had a mechanical fall and sustained left elbow fracture for which he might undergo surgical repair.     He was admitted to [facility] ([facility] in march of this year for GI bleed. He underwent GI endoscopy and was able to go back on warfarin.     His pacemaker is not MRI compatible.     Patient recently presented to [facility] with complaints of cough and shortness of breath.     The patient is doing well. Denies any chest pain or shortness of breath. His blood pressure is under good control. He is angina free and has no evidence of decompensation     He has bilateral lower ext edema. For that reason, we will discontinue furosemide and start him on torsemide 50 mg once daily.     His pacemaker has reached CHUCK on 12/30/2024. He is scheduled to undergo pacemaker battery replacement on 2/17/2025. We will proceed with pacemaker battery change. Risks, benefits, and  complications were discussed with the patient. Patient is in agreement to proceed. Advised patient to stop Eliquis 1 day prior to the procedure.     He is a former smoking 1.5-2ppd for 2 years, but quit 35 years ago        PAST MEDICAL HISTORY:   Past Medical History:   Diagnosis Date   • A-fib  (CMD)    • CAD (coronary artery disease)    • Cardiac pacemaker in situ    • Cellulitis     left leg cellulitis   • Congestive cardiac failure  (CMD)    • Diabetes mellitus  (CMD)    • Elbow fracture, left    • Essential (primary) hypertension    • Gastroesophageal reflux disease    • GIB (gastrointestinal bleeding)    • HLD (hyperlipidemia)    • Influenza A 2024   • Sick sinus syndrome  (CMD)    • Thyroid condition     Hypothyroid   • UTI (urinary tract infection)        PAST SURGICAL HISTORY:   Past Surgical History:   Procedure Laterality Date   • Esophagogastroduodenoscopy (egd)     • Hand surgery Right    • Knee surgery     • Left heart cath      with stenting   • Left heart cath      stenting   • Left heart cath      with stenting   • Pacemaker  2016    single chamber/RV lead/ medtronic   • Total hip replacement         FAMILY HISTORY:   Family History   Problem Relation Age of Onset   • Heart disease Mother    • Stroke Father        SOCIAL HISTORY:   Social History     Tobacco Use   • Smoking status: Former     Current packs/day: 0.00     Types: Cigarettes     Quit date:      Years since quittin.1   • Smokeless tobacco: Never   Vaping Use   • Vaping status: never used   Substance Use Topics   • Alcohol use: Not Currently     Alcohol/week: 2.0 standard drinks of alcohol     Types: 2 Cans of beer per week   • Drug use: Never       Drug Use:    Never             ALLERGIES:   ALLERGIES:   Allergen Reactions   • Adhesive   (Environmental) Other (See Comments)     Tape, Skin irritation       MEDICATIONS:   Current Outpatient Medications   Medication Sig Dispense Refill   • glipiZIDE (GLUCOTROL XL)  10 MG 24 hr tablet Take 1 tablet by mouth daily. 90 tablet 0   • pantoprazole (PROTONIX) 40 MG tablet Take 1 tablet by mouth daily. 90 tablet 0   • atorvastatin (LIPITOR) 40 MG tablet Take 1 tablet by mouth daily. Needs labs for future refills (Patient taking differently: Take 40 mg by mouth nightly. Needs labs for future refills) 90 tablet 0   • furosemide (LASIX) 40 MG tablet Take 1 tablet by mouth daily. 90 tablet 3   • potassium CHLORIDE (KLOR-CON M) 20 MEQ matt ER tablet Take 1 tablet by mouth daily. 90 tablet 1   • metFORMIN (GLUCOPHAGE-XR) 500 MG 24 hr tablet Take 1 tablet by mouth daily (with breakfast). 90 tablet 0   • NIFEdipine XL (PROCARDIA XL) 60 MG 24 hr tablet Take 1 tablet by mouth daily. Begin taking on December 29, 2024.     • losartan (COZAAR) 100 MG tablet Take 1 tablet by mouth daily. 90 tablet 1   • apixaBAN (Eliquis) 5 MG Tab Take 1 tablet by mouth every 12 hours. 60 tablet 5   • levothyroxine 25 MCG tablet Take 25 mcg by mouth every morning.     • terazosin (HYTRIN) 2 MG capsule Take 2 mg by mouth nightly. 2 capsules (=4 mg)     • metoPROLOL succinate (TOPROL-XL) 100 MG 24 hr tablet Take 100 mg by mouth every evening.       No current facility-administered medications for this visit.         Review of Systems   Constitutional: Negative.  Negative for chills and fever.   HENT: Negative.     Eyes: Negative.    Respiratory:  Negative for cough and shortness of breath.    Cardiovascular:  Positive for leg swelling. Negative for chest pain and palpitations.   Gastrointestinal: Negative.  Negative for diarrhea.   Endocrine: Negative.    Genitourinary: Negative.    Musculoskeletal: Negative.  Negative for myalgias.   Skin: Negative.    Neurological: Negative.    Hematological: Negative.    Psychiatric/Behavioral: Negative.         Ht 5' 10\" (1.778 m)   Wt 84.4 kg (186 lb)   BMI 26.69 kg/m²   BSA 2.02 m²       Physical Exam  Nursing note reviewed.   Constitutional:       Appearance: He is  well-developed.   HENT:      Head: Normocephalic and atraumatic.   Eyes:      Conjunctiva/sclera: Conjunctivae normal.      Pupils: Pupils are equal, round, and reactive to light.   Cardiovascular:      Rate and Rhythm: Normal rate and regular rhythm.      Heart sounds: No murmur heard.     No friction rub. No gallop.   Pulmonary:      Effort: Pulmonary effort is normal.      Breath sounds: Normal breath sounds.   Abdominal:      General: Bowel sounds are normal.      Palpations: Abdomen is soft.   Musculoskeletal:         General: Normal range of motion.      Cervical back: Normal range of motion and neck supple.      Right lower leg: Edema present.      Left lower leg: Edema present.   Skin:     General: Skin is warm and dry.   Neurological:      Mental Status: He is alert and oriented to person, place, and time.         ASSESSMENT:      Coronary artery disease,  -s/p coronary stenting in 2000 and 2003. He underwent a cardiac catheterization in 2006 and successful percutaneous transluminal coronary angioplasty in 2006  -TTE completed on 12/11/2024 was reviewed and discussed with patient. It showed ejection fraction 53% with mildly dilated left atrium  -lexiscan cardiolite stress test from 1/13/2025 showed a fixed inferior wall defect c/w a prior MI versus diaphragmatic attenuation artifact. No ischemia noted.    PAF   -Continue digoxin, Eliquis, and metoprolol     SSS (sick sinus syndrome) (CMS/HCC)  - s/p VVIR pacemaker placement in 04/2016  -His pacemaker has reached CHUCK on 12/30/2024. He is scheduled to undergo pacemaker battery replacement on 2/17/2025. We will proceed with pacemaker battery change. Risks, benefits, and complications were discussed with the patient. Patient is in agreement to proceed. Advised patient to stop Eliquis 1 day prior to the procedure.     Hypertension  -Blood pressure under good control   Continue metoprolol,losartan, and hydrochlorothiazide,    Hyperlipidemia,  -continue  atorvastatin to 40 mg once daily       PLAN:    Dear Dr. Forbes    Michael is a  74 year old male here for follow-up evaluation of his CAD s/p coronary stenting in 2000 and 2003. He underwent a cardiac catheterization in 2006 and successful percutaneous transluminal coronary angioplasty in 2006. He has a hx of SSS s/p VVIR pacemaker placement in 04/2016, Dyslipidemia, DM, and HTN. He also has a hx of chronic atrial fibrillation    He apparently had a mechanical fall and sustained left elbow fracture for which he might undergo surgical repair.     He was admitted to [facility] in march of this year for GI bleed. He underwent GI endoscopy and was able to go back on warfarin.     His pacemaker is not MRI compatible.     Patient recently presented to [facility] with complaints of cough and shortness of breath.     The patient is doing well. Denies any chest pain or shortness of breath. His blood pressure is under good control. He is angina free and has no evidence of decompensation     He has bilateral lower ext edema. For that reason, we will discontinue furosemide and start him on torsemide 50 mg once daily.     His pacemaker has reached CHUCK on 12/30/2024. He is scheduled to undergo pacemaker battery replacement on 2/17/2025. We will proceed with pacemaker battery change. Risks, benefits, and complications were discussed with the patient. Patient is in agreement to proceed. Advised patient to stop Eliquis 1 day prior to the procedure.     He is a former smoking 1.5-2ppd for 2 years, but quit 35 years ago    I had the pleasure of seeing our mutual patient today and above is the summary of my visit, please do not hesitate to contact me if you have any questions or concerns, thank you for the opportunity to share in the care of this patient.   ===================================  Recent Results:    1/1/2025: Lexiscan cardiolite stress test   Summary:     1. Myocardial perfusion imaging: Left ventricular size  is normal. There is no     transient ischemic dilation of the left ventricle during stress. The TID     ratio is 1.1. There is a medium-sized, mild-moderate, fixed defect     involving the mid and apical inferior wall(s), consistent with infarction,     due to diaphragm attenuation.  2. Stress: The calculated EF is 74%.  3. Stress ECG conclusions: The stress ECG is nondiagnostic.  4. Baseline ECG: Paced rhythm.  Impressions:     1. Abnormal myocardial perfusion scan showing a fixed inferior wall defect c/w     a prior MI versus diaphragmatic attenuation artifact. No ischemia noted.  2. Normal post stress LVEF, 74%.     12/11/2024: TTE   SUMMARY:     1. Left ventricle: The cavity size is normal. Wall thickness is mildly to     moderately increased. There is concentric hypertrophy. Systolic function is     normal. The ejection fraction was measured by biplane method of disks. The     ejection fraction is 53%.  2. Left atrium: The atrium is mildly dilated.  3. Right ventricle: The cavity size is normal. Wall thickness is normal.     Systolic function is normal. Pacemaker lead noted in right ventricle.  4. Right atrium: Pacemaker lead noted in right atrium.    6/6/2023: Lexiscan cardiolite stress test   Summary:     1. Myocardial perfusion imaging: Left ventricular size is normal. There is no     transient ischemic dilation of the left ventricle during stress. There is a     medium-sized, moderate, fixed defect involving the mid and apical inferior     wall(s), consistent with infarction.  2. Rest: The calculated EF is 65%.  3. Baseline ECG: Paced rhythm with pacemaker tracking.    12/2/2021: Lexiscan cardiolite stress test   Summary:     1. Myocardial perfusion imaging: Left ventricular size is normal. There is     no transient ischemic dilation of the left ventricle during stress. The     TID ratio is 0.93. There is a large, mild, fixed defect involving the     basal, mid, and apical inferior and apical wall(s),  likely due to     diaphragm attenuation. LV myocardial perfusion is otherwise normal.  2. Gated SPECT: The calculated left ventricular ejection fraction is 60%.     LV global systolic function is normal. There is dyskinesis involving     the basal and mid anteroseptal, basal and mid inferoseptal, and apical     septal wall of the left ventricle.  3. Stress ECG conclusions: Occasional ventricular ectopy. Occasional     atrial ectopy. The stress ECG is negative for ischemia.  Impressions:  Left ventricular global systolic function is normal.     12/2/2021: TTE  SUMMARY:     1. Left ventricle: The cavity size is normal. Wall thickness is mildly     increased. The ejection fraction was measured by biplane method of     disks. The ejection fraction is 55%.  2. Left atrium: The atrium is mildly dilated.  3. Right ventricle: Pacemaker lead noted in right ventricle. Systolic     function is normal.  4. Right atrium: Pacemaker lead noted in right atrium.  5. Pulmonary arteries: The peak pressure during systole by Doppler is 31mm     Hg.  6. Pericardium, extracardiac: A trivial pericardial effusion is identified     posterior to the heart.    2/5/2020: Carotid Duplex  - Mild Carotid Stenosis in both the left and right carotid arteries.      2/14/2019: Lexiscan Cardiolite Stress Test  IMPRESSION:  1. There is no evidence for ischemia or infarction by scintigraphic criteria.   2. Calculated EF is 51%, but appears normal by vidual inspection.   3. No evidence of wall motion abnormalities.         2/14/2019: TTE  SUMMARY:  1. Left ventricle: The cavity size is normal. There is mild concentric     hypertrophy. Systolic function is mildly reduced. The estimated     ejection fraction is 45-50%, by biplane method of disks. No regional     wall motion abnormaities visualzied.  2. Aortic valve: Mild regurgitation.  3. Mitral valve: Mild regurgitation.  4. Aortic root: The aortic root is mildly dilated.  5. Left atrium: The atrium is  mildly dilated.  6. Right ventricle: Pacemaker lead noted in right ventricle. The cavity size is mildly dilated. Wall thickness is normal. Systolic pressure is within the normal range. The estimated peak pressure is 27mm Hg.    BUN (mg/dL)   Date Value   12/28/2024 14     Creatinine (mg/dL)   Date Value   12/28/2024 0.70       CHOL/HDL ((calc))   Date Value   12/16/2022 4.2     CALCULATED LDL (mg/dL (calc))   Date Value   12/16/2022 118 (H)         Scribe Attestation: Entered by Kyung JACOBSEN, acting as scribe for Dr. Maynard.   Provider Attestation: The documentation recorded by the scribe accurately reflects the service I personally performed and the decisions made by me, Kenny Maynard MD.    stated

## 2025-02-17 ENCOUNTER — NON-APPOINTMENT (OUTPATIENT)
Age: 25
End: 2025-02-17

## 2025-02-17 DIAGNOSIS — Z3A.28 28 WEEKS GESTATION OF PREGNANCY: ICD-10-CM

## 2025-02-18 ENCOUNTER — NON-APPOINTMENT (OUTPATIENT)
Age: 25
End: 2025-02-18

## 2025-02-18 ENCOUNTER — APPOINTMENT (OUTPATIENT)
Dept: OBGYN | Facility: CLINIC | Age: 25
End: 2025-02-18
Payer: COMMERCIAL

## 2025-02-18 VITALS
SYSTOLIC BLOOD PRESSURE: 108 MMHG | HEIGHT: 67 IN | WEIGHT: 293 LBS | DIASTOLIC BLOOD PRESSURE: 70 MMHG | HEART RATE: 91 BPM | RESPIRATION RATE: 16 BRPM | BODY MASS INDEX: 45.99 KG/M2

## 2025-02-18 DIAGNOSIS — Z13.1 ENCOUNTER FOR SCREENING FOR DIABETES MELLITUS: ICD-10-CM

## 2025-02-18 DIAGNOSIS — Z3A.30 30 WEEKS GESTATION OF PREGNANCY: ICD-10-CM

## 2025-02-18 LAB
BILIRUB UR QL STRIP: NEGATIVE
GLUCOSE UR-MCNC: NEGATIVE
HCG UR QL: 4 EU/DL
HGB UR QL STRIP.AUTO: NEGATIVE
KETONES UR-MCNC: NEGATIVE
LEUKOCYTE ESTERASE UR QL STRIP: NEGATIVE
NITRITE UR QL STRIP: NEGATIVE
PH UR STRIP: 7
PROT UR STRIP-MCNC: NORMAL
SP GR UR STRIP: 1.02

## 2025-02-18 PROCEDURE — 81002 URINALYSIS NONAUTO W/O SCOPE: CPT

## 2025-02-18 PROCEDURE — 0502F SUBSEQUENT PRENATAL CARE: CPT

## 2025-02-28 ENCOUNTER — OUTPATIENT (OUTPATIENT)
Dept: OUTPATIENT SERVICES | Facility: HOSPITAL | Age: 25
LOS: 1 days | Discharge: HOME | End: 2025-02-28

## 2025-02-28 DIAGNOSIS — E61.1 IRON DEFICIENCY: ICD-10-CM

## 2025-03-04 ENCOUNTER — NON-APPOINTMENT (OUTPATIENT)
Age: 25
End: 2025-03-04

## 2025-03-04 ENCOUNTER — APPOINTMENT (OUTPATIENT)
Dept: OBGYN | Facility: CLINIC | Age: 25
End: 2025-03-04
Payer: COMMERCIAL

## 2025-03-04 VITALS
HEART RATE: 94 BPM | BODY MASS INDEX: 45.99 KG/M2 | WEIGHT: 293 LBS | DIASTOLIC BLOOD PRESSURE: 74 MMHG | HEIGHT: 67 IN | SYSTOLIC BLOOD PRESSURE: 114 MMHG

## 2025-03-04 DIAGNOSIS — Z3A.30 30 WEEKS GESTATION OF PREGNANCY: ICD-10-CM

## 2025-03-04 PROBLEM — Z3A.32 32 WEEKS GESTATION OF PREGNANCY: Status: ACTIVE | Noted: 2025-03-04

## 2025-03-04 LAB
BILIRUB UR QL STRIP: NEGATIVE
GLUCOSE UR-MCNC: NEGATIVE
HCG UR QL: 0.2 EU/DL
HGB UR QL STRIP.AUTO: NEGATIVE
KETONES UR-MCNC: NEGATIVE
LEUKOCYTE ESTERASE UR QL STRIP: NEGATIVE
NITRITE UR QL STRIP: NEGATIVE
PH UR STRIP: 7
PROT UR STRIP-MCNC: NORMAL
SP GR UR STRIP: 1.02

## 2025-03-04 PROCEDURE — 0502F SUBSEQUENT PRENATAL CARE: CPT

## 2025-03-04 PROCEDURE — 81002 URINALYSIS NONAUTO W/O SCOPE: CPT

## 2025-03-04 RX ORDER — FOLIC ACID 1 MG/1
1 TABLET ORAL DAILY
Qty: 90 | Refills: 3 | Status: ACTIVE | COMMUNITY
Start: 2025-03-04 | End: 1900-01-01

## 2025-03-04 RX ORDER — VALACYCLOVIR 500 MG/1
500 TABLET, FILM COATED ORAL DAILY
Qty: 90 | Refills: 3 | Status: ACTIVE | COMMUNITY
Start: 2025-03-04 | End: 1900-01-01

## 2025-03-07 ENCOUNTER — APPOINTMENT (OUTPATIENT)
Dept: HEMATOLOGY ONCOLOGY | Facility: CLINIC | Age: 25
End: 2025-03-07
Payer: COMMERCIAL

## 2025-03-07 VITALS
HEIGHT: 67 IN | OXYGEN SATURATION: 95 % | DIASTOLIC BLOOD PRESSURE: 80 MMHG | WEIGHT: 293 LBS | HEART RATE: 90 BPM | BODY MASS INDEX: 45.99 KG/M2 | SYSTOLIC BLOOD PRESSURE: 129 MMHG

## 2025-03-07 DIAGNOSIS — Z3A.32 32 WEEKS GESTATION OF PREGNANCY: ICD-10-CM

## 2025-03-07 DIAGNOSIS — R79.0 ABNORMAL LVL OF BLOOD MINERAL: ICD-10-CM

## 2025-03-07 PROCEDURE — 99203 OFFICE O/P NEW LOW 30 MIN: CPT

## 2025-03-07 RX ORDER — FERROUS SULFATE 324(65)MG
324 (65 FE) TABLET, DELAYED RELEASE (ENTERIC COATED) ORAL
Qty: 90 | Refills: 0 | Status: ACTIVE | COMMUNITY
Start: 2025-03-07 | End: 1900-01-01

## 2025-03-10 LAB
BASOPHILS # BLD AUTO: 0.03 K/UL
BASOPHILS NFR BLD AUTO: 0.3 %
EOSINOPHIL # BLD AUTO: 0.08 K/UL
EOSINOPHIL NFR BLD AUTO: 0.9 %
FERRITIN SERPL-MCNC: 5 NG/ML
FOLATE SERPL-MCNC: 14 NG/ML
HCT VFR BLD CALC: 33.3 %
HGB BLD-MCNC: 9.7 G/DL
IMM GRANULOCYTES NFR BLD AUTO: 0.3 %
IRON SATN MFR SERPL: 5 %
IRON SERPL-MCNC: 28 UG/DL
LYMPHOCYTES # BLD AUTO: 2.36 K/UL
LYMPHOCYTES NFR BLD AUTO: 26.3 %
MAN DIFF?: NORMAL
MCHC RBC-ENTMCNC: 22.4 PG
MCHC RBC-ENTMCNC: 29.1 G/DL
MCV RBC AUTO: 76.9 FL
MONOCYTES # BLD AUTO: 0.62 K/UL
MONOCYTES NFR BLD AUTO: 6.9 %
NEUTROPHILS # BLD AUTO: 5.87 K/UL
NEUTROPHILS NFR BLD AUTO: 65.3 %
PLATELET # BLD AUTO: 334 K/UL
RBC # BLD: 4.33 M/UL
RBC # FLD: 16.6 %
TIBC SERPL-MCNC: 516 UG/DL
UIBC SERPL-MCNC: 488 UG/DL
VIT B12 SERPL-MCNC: 285 PG/ML
WBC # FLD AUTO: 8.99 K/UL

## 2025-03-13 ENCOUNTER — NON-APPOINTMENT (OUTPATIENT)
Age: 25
End: 2025-03-13

## 2025-03-13 ENCOUNTER — ASOB RESULT (OUTPATIENT)
Age: 25
End: 2025-03-13

## 2025-03-13 ENCOUNTER — APPOINTMENT (OUTPATIENT)
Dept: ANTEPARTUM | Facility: CLINIC | Age: 25
End: 2025-03-13
Payer: COMMERCIAL

## 2025-03-13 PROCEDURE — 76819 FETAL BIOPHYS PROFIL W/O NST: CPT | Mod: 59

## 2025-03-13 PROCEDURE — 76816 OB US FOLLOW-UP PER FETUS: CPT

## 2025-03-18 ENCOUNTER — NON-APPOINTMENT (OUTPATIENT)
Age: 25
End: 2025-03-18

## 2025-03-18 ENCOUNTER — APPOINTMENT (OUTPATIENT)
Dept: OBGYN | Facility: CLINIC | Age: 25
End: 2025-03-18
Payer: COMMERCIAL

## 2025-03-18 VITALS
HEART RATE: 89 BPM | DIASTOLIC BLOOD PRESSURE: 69 MMHG | HEIGHT: 67 IN | WEIGHT: 293 LBS | SYSTOLIC BLOOD PRESSURE: 103 MMHG | BODY MASS INDEX: 45.99 KG/M2

## 2025-03-18 DIAGNOSIS — Z3A.32 32 WEEKS GESTATION OF PREGNANCY: ICD-10-CM

## 2025-03-18 PROBLEM — Z3A.34 34 WEEKS GESTATION OF PREGNANCY: Status: ACTIVE | Noted: 2025-03-18

## 2025-03-18 PROCEDURE — 81003 URINALYSIS AUTO W/O SCOPE: CPT | Mod: QW

## 2025-03-18 PROCEDURE — 0502F SUBSEQUENT PRENATAL CARE: CPT

## 2025-03-24 ENCOUNTER — NON-APPOINTMENT (OUTPATIENT)
Age: 25
End: 2025-03-24

## 2025-03-24 ENCOUNTER — APPOINTMENT (OUTPATIENT)
Dept: OBGYN | Facility: CLINIC | Age: 25
End: 2025-03-24
Payer: COMMERCIAL

## 2025-03-24 ENCOUNTER — APPOINTMENT (OUTPATIENT)
Age: 25
End: 2025-03-24

## 2025-03-24 VITALS
HEIGHT: 67 IN | SYSTOLIC BLOOD PRESSURE: 116 MMHG | HEART RATE: 90 BPM | WEIGHT: 293 LBS | DIASTOLIC BLOOD PRESSURE: 81 MMHG | BODY MASS INDEX: 45.99 KG/M2

## 2025-03-24 DIAGNOSIS — Z36.85 ENCOUNTER FOR ANTENATAL SCREENING FOR STREPTOCOCCUS B: ICD-10-CM

## 2025-03-24 DIAGNOSIS — Z11.4 ENCOUNTER FOR SCREENING FOR HUMAN IMMUNODEFICIENCY VIRUS [HIV]: ICD-10-CM

## 2025-03-24 DIAGNOSIS — Z13.0 ENCOUNTER FOR SCREENING FOR DISEASES OF THE BLOOD AND BLOOD-FORMING ORGANS AND CERTAIN DISORDERS INVOLVING THE IMMUNE MECHANISM: ICD-10-CM

## 2025-03-24 DIAGNOSIS — Z3A.34 34 WEEKS GESTATION OF PREGNANCY: ICD-10-CM

## 2025-03-24 DIAGNOSIS — Z11.3 ENCOUNTER FOR SCREENING FOR INFECTIONS WITH A PREDOMINANTLY SEXUAL MODE OF TRANSMISSION: ICD-10-CM

## 2025-03-24 PROBLEM — Z3A.35 35 WEEKS GESTATION OF PREGNANCY: Status: ACTIVE | Noted: 2025-03-24

## 2025-03-24 LAB
BILIRUB UR QL STRIP: NEGATIVE
BILIRUB UR QL STRIP: NORMAL
CLARITY UR: CLEAR
COLLECTION METHOD: NORMAL
GLUCOSE UR-MCNC: NEGATIVE
GLUCOSE UR-MCNC: NORMAL
HCG UR QL: 0.2 EU/DL
HCG UR QL: 0.2 EU/DL
HGB UR QL STRIP.AUTO: NEGATIVE
HGB UR QL STRIP.AUTO: NORMAL
KETONES UR-MCNC: NEGATIVE
KETONES UR-MCNC: NORMAL
LEUKOCYTE ESTERASE UR QL STRIP: NEGATIVE
LEUKOCYTE ESTERASE UR QL STRIP: NORMAL
NITRITE UR QL STRIP: NEGATIVE
NITRITE UR QL STRIP: NORMAL
PH UR STRIP: 6
PH UR STRIP: 7
PROT UR STRIP-MCNC: NORMAL
PROT UR STRIP-MCNC: NORMAL
SP GR UR STRIP: 1.02
SP GR UR STRIP: >=1.03

## 2025-03-24 PROCEDURE — 0502F SUBSEQUENT PRENATAL CARE: CPT

## 2025-03-24 PROCEDURE — 81002 URINALYSIS NONAUTO W/O SCOPE: CPT

## 2025-03-25 DIAGNOSIS — O99.013 ANEMIA COMPLICATING PREGNANCY, THIRD TRIMESTER: ICD-10-CM

## 2025-03-25 DIAGNOSIS — D50.9 IRON DEFICIENCY ANEMIA, UNSPECIFIED: ICD-10-CM

## 2025-03-25 DIAGNOSIS — D51.9 VITAMIN B12 DEFICIENCY ANEMIA, UNSPECIFIED: ICD-10-CM

## 2025-03-26 ENCOUNTER — APPOINTMENT (OUTPATIENT)
Dept: ANTEPARTUM | Facility: CLINIC | Age: 25
End: 2025-03-26
Payer: COMMERCIAL

## 2025-03-26 ENCOUNTER — ASOB RESULT (OUTPATIENT)
Age: 25
End: 2025-03-26

## 2025-03-26 PROCEDURE — 76818 FETAL BIOPHYS PROFILE W/NST: CPT

## 2025-03-27 ENCOUNTER — NON-APPOINTMENT (OUTPATIENT)
Age: 25
End: 2025-03-27

## 2025-03-27 ENCOUNTER — APPOINTMENT (OUTPATIENT)
Age: 25
End: 2025-03-27

## 2025-03-27 LAB
GP B STREP DNA SPEC QL NAA+PROBE: NOT DETECTED
SOURCE GBS: NORMAL

## 2025-03-31 ENCOUNTER — APPOINTMENT (OUTPATIENT)
Age: 25
End: 2025-03-31

## 2025-03-31 ENCOUNTER — APPOINTMENT (OUTPATIENT)
Dept: OBGYN | Facility: CLINIC | Age: 25
End: 2025-03-31
Payer: COMMERCIAL

## 2025-03-31 VITALS
WEIGHT: 293 LBS | DIASTOLIC BLOOD PRESSURE: 78 MMHG | SYSTOLIC BLOOD PRESSURE: 118 MMHG | HEART RATE: 91 BPM | HEIGHT: 67 IN | BODY MASS INDEX: 45.99 KG/M2

## 2025-03-31 PROCEDURE — 81002 URINALYSIS NONAUTO W/O SCOPE: CPT

## 2025-03-31 PROCEDURE — 0502F SUBSEQUENT PRENATAL CARE: CPT

## 2025-04-02 ENCOUNTER — APPOINTMENT (OUTPATIENT)
Dept: ANTEPARTUM | Facility: CLINIC | Age: 25
End: 2025-04-02
Payer: COMMERCIAL

## 2025-04-02 ENCOUNTER — ASOB RESULT (OUTPATIENT)
Age: 25
End: 2025-04-02

## 2025-04-02 PROCEDURE — 76818 FETAL BIOPHYS PROFILE W/NST: CPT

## 2025-04-03 ENCOUNTER — APPOINTMENT (OUTPATIENT)
Age: 25
End: 2025-04-03

## 2025-04-03 DIAGNOSIS — Z3A.35 35 WEEKS GESTATION OF PREGNANCY: ICD-10-CM

## 2025-04-07 ENCOUNTER — NON-APPOINTMENT (OUTPATIENT)
Age: 25
End: 2025-04-07

## 2025-04-07 ENCOUNTER — APPOINTMENT (OUTPATIENT)
Age: 25
End: 2025-04-07

## 2025-04-07 DIAGNOSIS — Z36.85 ENCOUNTER FOR ANTENATAL SCREENING FOR STREPTOCOCCUS B: ICD-10-CM

## 2025-04-07 PROBLEM — Z3A.37 37 WEEKS GESTATION OF PREGNANCY: Status: ACTIVE | Noted: 2025-04-07

## 2025-04-08 ENCOUNTER — APPOINTMENT (OUTPATIENT)
Dept: OBGYN | Facility: CLINIC | Age: 25
End: 2025-04-08
Payer: COMMERCIAL

## 2025-04-08 VITALS
SYSTOLIC BLOOD PRESSURE: 116 MMHG | HEART RATE: 101 BPM | HEIGHT: 67 IN | DIASTOLIC BLOOD PRESSURE: 77 MMHG | BODY MASS INDEX: 45.99 KG/M2 | WEIGHT: 293 LBS

## 2025-04-08 PROCEDURE — 81002 URINALYSIS NONAUTO W/O SCOPE: CPT

## 2025-04-08 PROCEDURE — 0502F SUBSEQUENT PRENATAL CARE: CPT

## 2025-04-09 ENCOUNTER — APPOINTMENT (OUTPATIENT)
Dept: ANTEPARTUM | Facility: CLINIC | Age: 25
End: 2025-04-09
Payer: COMMERCIAL

## 2025-04-09 ENCOUNTER — ASOB RESULT (OUTPATIENT)
Age: 25
End: 2025-04-09

## 2025-04-09 PROCEDURE — 76818 FETAL BIOPHYS PROFILE W/NST: CPT

## 2025-04-10 ENCOUNTER — NON-APPOINTMENT (OUTPATIENT)
Age: 25
End: 2025-04-10

## 2025-04-11 RX ORDER — ACETAMINOPHEN 500 MG/5ML
975 LIQUID (ML) ORAL ONCE
Refills: 0 | Status: COMPLETED | OUTPATIENT
Start: 2025-04-16 | End: 2025-04-16

## 2025-04-14 ENCOUNTER — OUTPATIENT (OUTPATIENT)
Dept: OUTPATIENT SERVICES | Facility: HOSPITAL | Age: 25
LOS: 1 days | End: 2025-04-14
Payer: COMMERCIAL

## 2025-04-14 DIAGNOSIS — Z01.812 ENCOUNTER FOR PREPROCEDURAL LABORATORY EXAMINATION: ICD-10-CM

## 2025-04-14 LAB
ANISOCYTOSIS BLD QL: ABNORMAL
BASOPHILS # BLD AUTO: 0.02 K/UL — SIGNIFICANT CHANGE UP (ref 0–0.2)
BASOPHILS NFR BLD AUTO: 0.3 % — SIGNIFICANT CHANGE UP (ref 0–2)
BLD GP AB SCN SERPL QL: SIGNIFICANT CHANGE UP
COMMENT - BLOOD BANK: SIGNIFICANT CHANGE UP
ELLIPTOCYTES BLD QL SMEAR: SLIGHT — SIGNIFICANT CHANGE UP
EOSINOPHIL # BLD AUTO: 0.06 K/UL — SIGNIFICANT CHANGE UP (ref 0–0.5)
EOSINOPHIL NFR BLD AUTO: 0.9 % — SIGNIFICANT CHANGE UP (ref 0–6)
HCT VFR BLD CALC: 39.4 % — SIGNIFICANT CHANGE UP (ref 34.5–45)
HGB BLD-MCNC: 12 G/DL — SIGNIFICANT CHANGE UP (ref 11.5–15.5)
IMM GRANULOCYTES # BLD AUTO: 0.02 K/UL — SIGNIFICANT CHANGE UP (ref 0–0.07)
IMM GRANULOCYTES NFR BLD AUTO: 0.3 % — SIGNIFICANT CHANGE UP (ref 0–0.9)
LYMPHOCYTES # BLD AUTO: 2.27 K/UL — SIGNIFICANT CHANGE UP (ref 1–3.3)
LYMPHOCYTES NFR BLD AUTO: 34.1 % — SIGNIFICANT CHANGE UP (ref 13–44)
MCHC RBC-ENTMCNC: 24.9 PG — LOW (ref 27–34)
MCHC RBC-ENTMCNC: 30.5 G/DL — LOW (ref 32–36)
MCV RBC AUTO: 81.7 FL — SIGNIFICANT CHANGE UP (ref 80–100)
MICROCYTES BLD QL: ABNORMAL
MONOCYTES # BLD AUTO: 0.36 K/UL — SIGNIFICANT CHANGE UP (ref 0–0.9)
MONOCYTES NFR BLD AUTO: 5.4 % — SIGNIFICANT CHANGE UP (ref 2–14)
NEUTROPHILS # BLD AUTO: 3.92 K/UL — SIGNIFICANT CHANGE UP (ref 1.8–7.4)
NEUTROPHILS NFR BLD AUTO: 59 % — SIGNIFICANT CHANGE UP (ref 43–77)
NRBC # BLD AUTO: 0 K/UL — SIGNIFICANT CHANGE UP (ref 0–0)
NRBC # FLD: 0 K/UL — SIGNIFICANT CHANGE UP (ref 0–0)
NRBC BLD AUTO-RTO: 0 /100 WBCS — SIGNIFICANT CHANGE UP (ref 0–0)
OVALOCYTES BLD QL SMEAR: SLIGHT — SIGNIFICANT CHANGE UP
PLAT MORPH BLD: NORMAL — SIGNIFICANT CHANGE UP
PLATELET # BLD AUTO: 323 K/UL — SIGNIFICANT CHANGE UP (ref 150–400)
PMV BLD: 10.2 FL — SIGNIFICANT CHANGE UP (ref 7–13)
POIKILOCYTOSIS BLD QL AUTO: SLIGHT — SIGNIFICANT CHANGE UP
POLYCHROMASIA BLD QL SMEAR: SLIGHT — SIGNIFICANT CHANGE UP
RBC # BLD: 4.82 M/UL — SIGNIFICANT CHANGE UP (ref 3.8–5.2)
RBC # FLD: 23.2 % — HIGH (ref 10.3–14.5)
RBC BLD AUTO: ABNORMAL
WBC # BLD: 6.65 K/UL — SIGNIFICANT CHANGE UP (ref 3.8–10.5)
WBC # FLD AUTO: 6.65 K/UL — SIGNIFICANT CHANGE UP (ref 3.8–10.5)

## 2025-04-14 PROCEDURE — 86850 RBC ANTIBODY SCREEN: CPT

## 2025-04-14 PROCEDURE — 86901 BLOOD TYPING SEROLOGIC RH(D): CPT

## 2025-04-14 PROCEDURE — 36415 COLL VENOUS BLD VENIPUNCTURE: CPT

## 2025-04-14 PROCEDURE — 86900 BLOOD TYPING SEROLOGIC ABO: CPT

## 2025-04-14 PROCEDURE — 85025 COMPLETE CBC W/AUTO DIFF WBC: CPT

## 2025-04-15 ENCOUNTER — APPOINTMENT (OUTPATIENT)
Dept: OBGYN | Facility: CLINIC | Age: 25
End: 2025-04-15
Payer: COMMERCIAL

## 2025-04-15 ENCOUNTER — NON-APPOINTMENT (OUTPATIENT)
Age: 25
End: 2025-04-15

## 2025-04-15 VITALS
BODY MASS INDEX: 45.99 KG/M2 | SYSTOLIC BLOOD PRESSURE: 107 MMHG | HEART RATE: 86 BPM | HEIGHT: 67 IN | DIASTOLIC BLOOD PRESSURE: 72 MMHG | WEIGHT: 293 LBS

## 2025-04-15 DIAGNOSIS — Z3A.37 37 WEEKS GESTATION OF PREGNANCY: ICD-10-CM

## 2025-04-15 PROBLEM — Z3A.38 38 WEEKS GESTATION OF PREGNANCY: Status: ACTIVE | Noted: 2025-04-15

## 2025-04-15 LAB
BILIRUB UR QL STRIP: NEGATIVE
GLUCOSE UR-MCNC: NEGATIVE
HCG UR QL: 0.2 EU/DL
HGB UR QL STRIP.AUTO: NEGATIVE
KETONES UR-MCNC: NEGATIVE
LEUKOCYTE ESTERASE UR QL STRIP: NORMAL
NITRITE UR QL STRIP: NEGATIVE
PH UR STRIP: 7
PROT UR STRIP-MCNC: NORMAL
SP GR UR STRIP: 1.02

## 2025-04-15 PROCEDURE — 81002 URINALYSIS NONAUTO W/O SCOPE: CPT

## 2025-04-15 PROCEDURE — 59426 ANTEPARTUM CARE ONLY: CPT

## 2025-04-15 PROCEDURE — 0502F SUBSEQUENT PRENATAL CARE: CPT

## 2025-04-16 ENCOUNTER — TRANSCRIPTION ENCOUNTER (OUTPATIENT)
Age: 25
End: 2025-04-16

## 2025-04-16 ENCOUNTER — INPATIENT (INPATIENT)
Facility: HOSPITAL | Age: 25
LOS: 2 days | Discharge: HOME | DRG: 951 | End: 2025-04-19
Attending: OBSTETRICS & GYNECOLOGY | Admitting: OBSTETRICS & GYNECOLOGY
Payer: COMMERCIAL

## 2025-04-16 ENCOUNTER — APPOINTMENT (OUTPATIENT)
Dept: OBGYN | Facility: HOSPITAL | Age: 25
End: 2025-04-16

## 2025-04-16 ENCOUNTER — APPOINTMENT (OUTPATIENT)
Dept: ANTEPARTUM | Facility: CLINIC | Age: 25
End: 2025-04-16

## 2025-04-16 VITALS — TEMPERATURE: 98 F

## 2025-04-16 DIAGNOSIS — Z98.891 HISTORY OF UTERINE SCAR FROM PREVIOUS SURGERY: Chronic | ICD-10-CM

## 2025-04-16 DIAGNOSIS — Z3A.39 39 WEEKS GESTATION OF PREGNANCY: ICD-10-CM

## 2025-04-16 LAB
BASOPHILS # BLD AUTO: 0.01 K/UL — SIGNIFICANT CHANGE UP (ref 0–0.2)
BASOPHILS NFR BLD AUTO: 0.1 % — SIGNIFICANT CHANGE UP (ref 0–2)
BLD GP AB SCN SERPL QL: SIGNIFICANT CHANGE UP
EOSINOPHIL # BLD AUTO: 0.04 K/UL — SIGNIFICANT CHANGE UP (ref 0–0.5)
EOSINOPHIL NFR BLD AUTO: 0.6 % — SIGNIFICANT CHANGE UP (ref 0–6)
HCT VFR BLD CALC: 39.2 % — SIGNIFICANT CHANGE UP (ref 34.5–45)
HGB BLD-MCNC: 12.2 G/DL — SIGNIFICANT CHANGE UP (ref 11.5–15.5)
HIV 1 & 2 AB SERPL IA.RAPID: SIGNIFICANT CHANGE UP
IMM GRANULOCYTES # BLD AUTO: 0.03 K/UL — SIGNIFICANT CHANGE UP (ref 0–0.07)
IMM GRANULOCYTES NFR BLD AUTO: 0.4 % — SIGNIFICANT CHANGE UP (ref 0–0.9)
LYMPHOCYTES # BLD AUTO: 3.01 K/UL — SIGNIFICANT CHANGE UP (ref 1–3.3)
LYMPHOCYTES NFR BLD AUTO: 43.2 % — SIGNIFICANT CHANGE UP (ref 13–44)
MCHC RBC-ENTMCNC: 24.6 PG — LOW (ref 27–34)
MCHC RBC-ENTMCNC: 31.1 G/DL — LOW (ref 32–36)
MCV RBC AUTO: 79.2 FL — LOW (ref 80–100)
MONOCYTES # BLD AUTO: 0.34 K/UL — SIGNIFICANT CHANGE UP (ref 0–0.9)
MONOCYTES NFR BLD AUTO: 4.9 % — SIGNIFICANT CHANGE UP (ref 2–14)
NEUTROPHILS # BLD AUTO: 3.54 K/UL — SIGNIFICANT CHANGE UP (ref 1.8–7.4)
NEUTROPHILS NFR BLD AUTO: 50.8 % — SIGNIFICANT CHANGE UP (ref 43–77)
NRBC # BLD AUTO: 0 K/UL — SIGNIFICANT CHANGE UP (ref 0–0)
NRBC # FLD: 0 K/UL — SIGNIFICANT CHANGE UP (ref 0–0)
NRBC BLD AUTO-RTO: 0 /100 WBCS — SIGNIFICANT CHANGE UP (ref 0–0)
PLATELET # BLD AUTO: 309 K/UL — SIGNIFICANT CHANGE UP (ref 150–400)
PMV BLD: 9.8 FL — SIGNIFICANT CHANGE UP (ref 7–13)
RBC # BLD: 4.95 M/UL — SIGNIFICANT CHANGE UP (ref 3.8–5.2)
RBC # FLD: 22.9 % — HIGH (ref 10.3–14.5)
T PALLIDUM AB TITR SER: NEGATIVE — SIGNIFICANT CHANGE UP
WBC # BLD: 6.97 K/UL — SIGNIFICANT CHANGE UP (ref 3.8–10.5)
WBC # FLD AUTO: 6.97 K/UL — SIGNIFICANT CHANGE UP (ref 3.8–10.5)

## 2025-04-16 PROCEDURE — 59025 FETAL NON-STRESS TEST: CPT | Mod: 26

## 2025-04-16 PROCEDURE — 59515 CESAREAN DELIVERY: CPT

## 2025-04-16 RX ORDER — CEFAZOLIN SODIUM IN 0.9 % NACL 3 G/100 ML
3000 INTRAVENOUS SOLUTION, PIGGYBACK (ML) INTRAVENOUS ONCE
Refills: 0 | Status: COMPLETED | OUTPATIENT
Start: 2025-04-16 | End: 2025-04-16

## 2025-04-16 RX ORDER — FERROUS SULFATE 137(45) MG
325 TABLET, EXTENDED RELEASE ORAL DAILY
Refills: 0 | Status: DISCONTINUED | OUTPATIENT
Start: 2025-04-16 | End: 2025-04-19

## 2025-04-16 RX ORDER — PRENATAL 136/IRON/FOLIC ACID 27 MG-1 MG
1 TABLET ORAL DAILY
Refills: 0 | Status: DISCONTINUED | OUTPATIENT
Start: 2025-04-16 | End: 2025-04-19

## 2025-04-16 RX ORDER — MODIFIED LANOLIN 100 %
1 CREAM (GRAM) TOPICAL EVERY 6 HOURS
Refills: 0 | Status: DISCONTINUED | OUTPATIENT
Start: 2025-04-16 | End: 2025-04-19

## 2025-04-16 RX ORDER — ENOXAPARIN SODIUM 100 MG/ML
60 INJECTION SUBCUTANEOUS EVERY 24 HOURS
Refills: 0 | Status: DISCONTINUED | OUTPATIENT
Start: 2025-04-16 | End: 2025-04-19

## 2025-04-16 RX ORDER — CITRIC ACID/SODIUM CITRATE 300-500 MG
30 SOLUTION, ORAL ORAL ONCE
Refills: 0 | Status: COMPLETED | OUTPATIENT
Start: 2025-04-16 | End: 2025-04-16

## 2025-04-16 RX ORDER — NALBUPHINE HYDROCHLORIDE 10 MG/ML
2.5 INJECTION INTRAMUSCULAR; INTRAVENOUS; SUBCUTANEOUS EVERY 6 HOURS
Refills: 0 | Status: DISCONTINUED | OUTPATIENT
Start: 2025-04-16 | End: 2025-04-19

## 2025-04-16 RX ORDER — NALOXONE HYDROCHLORIDE 0.4 MG/ML
0.1 INJECTION, SOLUTION INTRAMUSCULAR; INTRAVENOUS; SUBCUTANEOUS
Refills: 0 | Status: DISCONTINUED | OUTPATIENT
Start: 2025-04-16 | End: 2025-04-19

## 2025-04-16 RX ORDER — SODIUM CHLORIDE 9 G/1000ML
1000 INJECTION, SOLUTION INTRAVENOUS
Refills: 0 | Status: DISCONTINUED | OUTPATIENT
Start: 2025-04-16 | End: 2025-04-16

## 2025-04-16 RX ORDER — OXYTOCIN-SODIUM CHLORIDE 0.9% IV SOLN 30 UNIT/500ML 30-0.9/5 UT/ML-%
42 SOLUTION INTRAVENOUS
Qty: 30 | Refills: 0 | Status: DISCONTINUED | OUTPATIENT
Start: 2025-04-16 | End: 2025-04-19

## 2025-04-16 RX ORDER — OXYCODONE HYDROCHLORIDE 30 MG/1
5 TABLET ORAL ONCE
Refills: 0 | Status: DISCONTINUED | OUTPATIENT
Start: 2025-04-16 | End: 2025-04-19

## 2025-04-16 RX ORDER — KETOROLAC TROMETHAMINE 30 MG/ML
30 INJECTION, SOLUTION INTRAMUSCULAR; INTRAVENOUS EVERY 6 HOURS
Refills: 0 | Status: DISCONTINUED | OUTPATIENT
Start: 2025-04-16 | End: 2025-04-16

## 2025-04-16 RX ORDER — MAGNESIUM HYDROXIDE 400 MG/5ML
30 SUSPENSION ORAL
Refills: 0 | Status: DISCONTINUED | OUTPATIENT
Start: 2025-04-16 | End: 2025-04-19

## 2025-04-16 RX ORDER — DIPHENHYDRAMINE HCL 12.5MG/5ML
25 ELIXIR ORAL EVERY 4 HOURS
Refills: 0 | Status: DISCONTINUED | OUTPATIENT
Start: 2025-04-16 | End: 2025-04-19

## 2025-04-16 RX ORDER — SIMETHICONE 80 MG
80 TABLET,CHEWABLE ORAL EVERY 4 HOURS
Refills: 0 | Status: DISCONTINUED | OUTPATIENT
Start: 2025-04-16 | End: 2025-04-19

## 2025-04-16 RX ORDER — IBUPROFEN 200 MG
600 TABLET ORAL EVERY 6 HOURS
Refills: 0 | Status: COMPLETED | OUTPATIENT
Start: 2025-04-16 | End: 2026-03-15

## 2025-04-16 RX ORDER — OXYCODONE HYDROCHLORIDE 30 MG/1
5 TABLET ORAL
Refills: 0 | Status: DISCONTINUED | OUTPATIENT
Start: 2025-04-16 | End: 2025-04-19

## 2025-04-16 RX ORDER — DIPHENHYDRAMINE HCL 12.5MG/5ML
25 ELIXIR ORAL EVERY 6 HOURS
Refills: 0 | Status: DISCONTINUED | OUTPATIENT
Start: 2025-04-16 | End: 2025-04-19

## 2025-04-16 RX ORDER — ONDANSETRON HCL/PF 4 MG/2 ML
4 VIAL (ML) INJECTION EVERY 6 HOURS
Refills: 0 | Status: DISCONTINUED | OUTPATIENT
Start: 2025-04-16 | End: 2025-04-19

## 2025-04-16 RX ORDER — CEFAZOLIN SODIUM IN 0.9 % NACL 3 G/100 ML
3000 INTRAVENOUS SOLUTION, PIGGYBACK (ML) INTRAVENOUS ONCE
Refills: 0 | Status: DISCONTINUED | OUTPATIENT
Start: 2025-04-16 | End: 2025-04-16

## 2025-04-16 RX ORDER — DEXAMETHASONE 0.5 MG/1
4 TABLET ORAL EVERY 6 HOURS
Refills: 0 | Status: DISCONTINUED | OUTPATIENT
Start: 2025-04-16 | End: 2025-04-19

## 2025-04-16 RX ORDER — SCOPOLAMINE 1 MG/3D
1 PATCH, EXTENDED RELEASE TRANSDERMAL ONCE
Refills: 0 | Status: COMPLETED | OUTPATIENT
Start: 2025-04-16 | End: 2025-04-16

## 2025-04-16 RX ORDER — OXYCODONE HYDROCHLORIDE 30 MG/1
5 TABLET ORAL
Refills: 0 | Status: DISCONTINUED | OUTPATIENT
Start: 2025-04-16 | End: 2025-04-16

## 2025-04-16 RX ORDER — FOLIC ACID 1 MG/1
1 TABLET ORAL DAILY
Refills: 0 | Status: DISCONTINUED | OUTPATIENT
Start: 2025-04-16 | End: 2025-04-19

## 2025-04-16 RX ORDER — SODIUM CHLORIDE 9 G/1000ML
1000 INJECTION, SOLUTION INTRAVENOUS
Refills: 0 | Status: DISCONTINUED | OUTPATIENT
Start: 2025-04-16 | End: 2025-04-19

## 2025-04-16 RX ORDER — ACETAMINOPHEN 500 MG/5ML
975 LIQUID (ML) ORAL
Refills: 0 | Status: DISCONTINUED | OUTPATIENT
Start: 2025-04-16 | End: 2025-04-19

## 2025-04-16 RX ADMIN — Medication 20 MILLIGRAM(S): at 07:02

## 2025-04-16 RX ADMIN — SODIUM CHLORIDE 125 MILLILITER(S): 9 INJECTION, SOLUTION INTRAVENOUS at 06:45

## 2025-04-16 RX ADMIN — KETOROLAC TROMETHAMINE 30 MILLIGRAM(S): 30 INJECTION, SOLUTION INTRAMUSCULAR; INTRAVENOUS at 23:35

## 2025-04-16 RX ADMIN — Medication 975 MILLIGRAM(S): at 15:22

## 2025-04-16 RX ADMIN — KETOROLAC TROMETHAMINE 30 MILLIGRAM(S): 30 INJECTION, SOLUTION INTRAMUSCULAR; INTRAVENOUS at 09:00

## 2025-04-16 RX ADMIN — KETOROLAC TROMETHAMINE 30 MILLIGRAM(S): 30 INJECTION, SOLUTION INTRAMUSCULAR; INTRAVENOUS at 12:50

## 2025-04-16 RX ADMIN — Medication 30 MILLILITER(S): at 07:01

## 2025-04-16 RX ADMIN — Medication 975 MILLIGRAM(S): at 07:34

## 2025-04-16 RX ADMIN — Medication 975 MILLIGRAM(S): at 07:04

## 2025-04-16 RX ADMIN — KETOROLAC TROMETHAMINE 30 MILLIGRAM(S): 30 INJECTION, SOLUTION INTRAMUSCULAR; INTRAVENOUS at 08:30

## 2025-04-16 RX ADMIN — SCOPOLAMINE 1 PATCH: 1 PATCH, EXTENDED RELEASE TRANSDERMAL at 07:03

## 2025-04-16 RX ADMIN — Medication 200 MILLIGRAM(S): at 07:48

## 2025-04-16 RX ADMIN — OXYTOCIN-SODIUM CHLORIDE 0.9% IV SOLN 30 UNIT/500ML 42 MILLIUNIT(S)/MIN: 30-0.9/5 SOLUTION at 08:23

## 2025-04-16 RX ADMIN — Medication 1 APPLICATION(S): at 06:52

## 2025-04-16 RX ADMIN — KETOROLAC TROMETHAMINE 30 MILLIGRAM(S): 30 INJECTION, SOLUTION INTRAMUSCULAR; INTRAVENOUS at 18:22

## 2025-04-16 RX ADMIN — Medication 80 MILLIGRAM(S): at 15:22

## 2025-04-16 RX ADMIN — ENOXAPARIN SODIUM 60 MILLIGRAM(S): 100 INJECTION SUBCUTANEOUS at 21:09

## 2025-04-16 RX ADMIN — SCOPOLAMINE 1 PATCH: 1 PATCH, EXTENDED RELEASE TRANSDERMAL at 21:25

## 2025-04-16 RX ADMIN — Medication 975 MILLIGRAM(S): at 21:09

## 2025-04-17 LAB
BASOPHILS # BLD AUTO: 0.03 K/UL — SIGNIFICANT CHANGE UP (ref 0–0.2)
BASOPHILS NFR BLD AUTO: 0.3 % — SIGNIFICANT CHANGE UP (ref 0–2)
EOSINOPHIL # BLD AUTO: 0.05 K/UL — SIGNIFICANT CHANGE UP (ref 0–0.5)
EOSINOPHIL NFR BLD AUTO: 0.4 % — SIGNIFICANT CHANGE UP (ref 0–6)
HCT VFR BLD CALC: 34.5 % — SIGNIFICANT CHANGE UP (ref 34.5–45)
HGB BLD-MCNC: 10.3 G/DL — LOW (ref 11.5–15.5)
HIV 1+2 AB+HIV1 P24 AG SERPL QL IA: SIGNIFICANT CHANGE UP
IMM GRANULOCYTES # BLD AUTO: 0.04 K/UL — SIGNIFICANT CHANGE UP (ref 0–0.07)
IMM GRANULOCYTES NFR BLD AUTO: 0.3 % — SIGNIFICANT CHANGE UP (ref 0–0.9)
LYMPHOCYTES # BLD AUTO: 4.23 K/UL — HIGH (ref 1–3.3)
LYMPHOCYTES NFR BLD AUTO: 35.4 % — SIGNIFICANT CHANGE UP (ref 13–44)
MCHC RBC-ENTMCNC: 24.3 PG — LOW (ref 27–34)
MCHC RBC-ENTMCNC: 29.9 G/DL — LOW (ref 32–36)
MCV RBC AUTO: 81.4 FL — SIGNIFICANT CHANGE UP (ref 80–100)
MONOCYTES # BLD AUTO: 0.86 K/UL — SIGNIFICANT CHANGE UP (ref 0–0.9)
MONOCYTES NFR BLD AUTO: 7.2 % — SIGNIFICANT CHANGE UP (ref 2–14)
NEUTROPHILS # BLD AUTO: 6.75 K/UL — SIGNIFICANT CHANGE UP (ref 1.8–7.4)
NEUTROPHILS NFR BLD AUTO: 56.4 % — SIGNIFICANT CHANGE UP (ref 43–77)
NRBC # BLD AUTO: 0 K/UL — SIGNIFICANT CHANGE UP (ref 0–0)
NRBC # FLD: 0 K/UL — SIGNIFICANT CHANGE UP (ref 0–0)
NRBC BLD AUTO-RTO: 0 /100 WBCS — SIGNIFICANT CHANGE UP (ref 0–0)
PLATELET # BLD AUTO: 279 K/UL — SIGNIFICANT CHANGE UP (ref 150–400)
PMV BLD: 10.1 FL — SIGNIFICANT CHANGE UP (ref 7–13)
RBC # BLD: 4.24 M/UL — SIGNIFICANT CHANGE UP (ref 3.8–5.2)
RBC # FLD: 22.6 % — HIGH (ref 10.3–14.5)
WBC # BLD: 11.96 K/UL — HIGH (ref 3.8–10.5)
WBC # FLD AUTO: 11.96 K/UL — HIGH (ref 3.8–10.5)

## 2025-04-17 RX ORDER — SODIUM CHLORIDE 9 G/1000ML
1000 INJECTION, SOLUTION INTRAVENOUS ONCE
Refills: 0 | Status: COMPLETED | OUTPATIENT
Start: 2025-04-17 | End: 2025-04-17

## 2025-04-17 RX ORDER — IBUPROFEN 200 MG
600 TABLET ORAL EVERY 6 HOURS
Refills: 0 | Status: DISCONTINUED | OUTPATIENT
Start: 2025-04-17 | End: 2025-04-19

## 2025-04-17 RX ADMIN — Medication 975 MILLIGRAM(S): at 21:29

## 2025-04-17 RX ADMIN — Medication 1 TABLET(S): at 12:26

## 2025-04-17 RX ADMIN — Medication 975 MILLIGRAM(S): at 03:31

## 2025-04-17 RX ADMIN — Medication 500 MILLIGRAM(S): at 12:26

## 2025-04-17 RX ADMIN — Medication 600 MILLIGRAM(S): at 12:26

## 2025-04-17 RX ADMIN — ENOXAPARIN SODIUM 60 MILLIGRAM(S): 100 INJECTION SUBCUTANEOUS at 21:30

## 2025-04-17 RX ADMIN — Medication 975 MILLIGRAM(S): at 08:52

## 2025-04-17 RX ADMIN — Medication 600 MILLIGRAM(S): at 18:46

## 2025-04-17 RX ADMIN — Medication 600 MILLIGRAM(S): at 05:28

## 2025-04-17 RX ADMIN — Medication 80 MILLIGRAM(S): at 08:52

## 2025-04-17 RX ADMIN — SODIUM CHLORIDE 1000 MILLILITER(S): 9 INJECTION, SOLUTION INTRAVENOUS at 01:30

## 2025-04-17 RX ADMIN — Medication 975 MILLIGRAM(S): at 15:44

## 2025-04-17 RX ADMIN — Medication 325 MILLIGRAM(S): at 12:26

## 2025-04-17 RX ADMIN — FOLIC ACID 1 MILLIGRAM(S): 1 TABLET ORAL at 12:26

## 2025-04-17 RX ADMIN — Medication 80 MILLIGRAM(S): at 15:45

## 2025-04-18 RX ORDER — FERROUS SULFATE 137(45) MG
1 TABLET, EXTENDED RELEASE ORAL
Qty: 30 | Refills: 0
Start: 2025-04-18 | End: 2025-05-17

## 2025-04-18 RX ORDER — ACETAMINOPHEN 500 MG/5ML
3 LIQUID (ML) ORAL
Qty: 84 | Refills: 0
Start: 2025-04-18 | End: 2025-04-24

## 2025-04-18 RX ORDER — IBUPROFEN 200 MG
1 TABLET ORAL
Qty: 28 | Refills: 0
Start: 2025-04-18 | End: 2025-04-24

## 2025-04-18 RX ORDER — FOLIC ACID 1 MG/1
1 TABLET ORAL
Qty: 0 | Refills: 0 | DISCHARGE
Start: 2025-04-18

## 2025-04-18 RX ADMIN — Medication 975 MILLIGRAM(S): at 03:44

## 2025-04-18 RX ADMIN — Medication 325 MILLIGRAM(S): at 12:33

## 2025-04-18 RX ADMIN — ENOXAPARIN SODIUM 60 MILLIGRAM(S): 100 INJECTION SUBCUTANEOUS at 21:24

## 2025-04-18 RX ADMIN — Medication 500 MILLIGRAM(S): at 12:33

## 2025-04-18 RX ADMIN — Medication 975 MILLIGRAM(S): at 21:24

## 2025-04-18 RX ADMIN — Medication 600 MILLIGRAM(S): at 23:23

## 2025-04-18 RX ADMIN — Medication 600 MILLIGRAM(S): at 00:47

## 2025-04-18 RX ADMIN — Medication 600 MILLIGRAM(S): at 05:27

## 2025-04-18 RX ADMIN — Medication 1 TABLET(S): at 12:34

## 2025-04-18 RX ADMIN — FOLIC ACID 1 MILLIGRAM(S): 1 TABLET ORAL at 12:33

## 2025-04-18 RX ADMIN — Medication 975 MILLIGRAM(S): at 09:57

## 2025-04-18 RX ADMIN — Medication 975 MILLIGRAM(S): at 15:48

## 2025-04-18 RX ADMIN — Medication 600 MILLIGRAM(S): at 18:11

## 2025-04-18 RX ADMIN — Medication 600 MILLIGRAM(S): at 12:34

## 2025-04-19 VITALS
SYSTOLIC BLOOD PRESSURE: 108 MMHG | RESPIRATION RATE: 18 BRPM | TEMPERATURE: 98 F | HEART RATE: 84 BPM | DIASTOLIC BLOOD PRESSURE: 71 MMHG | OXYGEN SATURATION: 96 %

## 2025-04-19 PROCEDURE — 90715 TDAP VACCINE 7 YRS/> IM: CPT

## 2025-04-19 PROCEDURE — 86901 BLOOD TYPING SEROLOGIC RH(D): CPT

## 2025-04-19 PROCEDURE — 86900 BLOOD TYPING SEROLOGIC ABO: CPT

## 2025-04-19 PROCEDURE — 59050 FETAL MONITOR W/REPORT: CPT

## 2025-04-19 PROCEDURE — 87389 HIV-1 AG W/HIV-1&-2 AB AG IA: CPT

## 2025-04-19 PROCEDURE — 85025 COMPLETE CBC W/AUTO DIFF WBC: CPT

## 2025-04-19 PROCEDURE — 86850 RBC ANTIBODY SCREEN: CPT

## 2025-04-19 PROCEDURE — 86703 HIV-1/HIV-2 1 RESULT ANTBDY: CPT

## 2025-04-19 PROCEDURE — 86780 TREPONEMA PALLIDUM: CPT

## 2025-04-19 PROCEDURE — 36415 COLL VENOUS BLD VENIPUNCTURE: CPT

## 2025-04-19 RX ADMIN — Medication 975 MILLIGRAM(S): at 03:12

## 2025-04-19 RX ADMIN — Medication 975 MILLIGRAM(S): at 09:38

## 2025-04-19 RX ADMIN — Medication 600 MILLIGRAM(S): at 05:47

## 2025-04-21 ENCOUNTER — NON-APPOINTMENT (OUTPATIENT)
Age: 25
End: 2025-04-21

## 2025-04-21 DIAGNOSIS — Z11.3 ENCOUNTER FOR SCREENING FOR INFECTIONS WITH A PREDOMINANTLY SEXUAL MODE OF TRANSMISSION: ICD-10-CM

## 2025-04-21 DIAGNOSIS — O99.210 OBESITY COMPLICATING PREGNANCY, UNSPECIFIED TRIMESTER: ICD-10-CM

## 2025-04-21 DIAGNOSIS — O28.1 ABNORMAL BIOCHEMICAL FINDING ON ANTENATAL SCREENING OF MOTHER: ICD-10-CM

## 2025-04-21 DIAGNOSIS — Z98.891 HISTORY OF UTERINE SCAR FROM PREVIOUS SURGERY: ICD-10-CM

## 2025-04-21 DIAGNOSIS — Z11.4 ENCOUNTER FOR SCREENING FOR HUMAN IMMUNODEFICIENCY VIRUS [HIV]: ICD-10-CM

## 2025-04-21 DIAGNOSIS — Z3A.38 38 WEEKS GESTATION OF PREGNANCY: ICD-10-CM

## 2025-04-21 DIAGNOSIS — O36.60X0 MATERNAL CARE FOR EXCESSIVE FETAL GROWTH, UNSPECIFIED TRIMESTER, NOT APPLICABLE OR UNSPECIFIED: ICD-10-CM

## 2025-04-21 DIAGNOSIS — E66.01 OBESITY COMPLICATING PREGNANCY, UNSPECIFIED TRIMESTER: ICD-10-CM

## 2025-04-21 DIAGNOSIS — Z13.0 ENCOUNTER FOR SCREENING FOR DISEASES OF THE BLOOD AND BLOOD-FORMING ORGANS AND CERTAIN DISORDERS INVOLVING THE IMMUNE MECHANISM: ICD-10-CM

## 2025-04-21 DIAGNOSIS — Z98.890 OTHER SPECIFIED POSTPROCEDURAL STATES: ICD-10-CM

## 2025-04-22 ENCOUNTER — APPOINTMENT (OUTPATIENT)
Dept: OBGYN | Facility: CLINIC | Age: 25
End: 2025-04-22
Payer: COMMERCIAL

## 2025-04-22 VITALS
BODY MASS INDEX: 45.99 KG/M2 | DIASTOLIC BLOOD PRESSURE: 86 MMHG | SYSTOLIC BLOOD PRESSURE: 125 MMHG | WEIGHT: 293 LBS | HEIGHT: 67 IN | HEART RATE: 62 BPM

## 2025-04-22 PROCEDURE — 0503F POSTPARTUM CARE VISIT: CPT

## 2025-04-23 ENCOUNTER — APPOINTMENT (OUTPATIENT)
Dept: ANTEPARTUM | Facility: CLINIC | Age: 25
End: 2025-04-23

## 2025-05-06 ENCOUNTER — APPOINTMENT (OUTPATIENT)
Dept: OBGYN | Facility: CLINIC | Age: 25
End: 2025-05-06
Payer: COMMERCIAL

## 2025-05-06 VITALS
HEART RATE: 73 BPM | WEIGHT: 293 LBS | SYSTOLIC BLOOD PRESSURE: 113 MMHG | BODY MASS INDEX: 45.99 KG/M2 | HEIGHT: 67 IN | DIASTOLIC BLOOD PRESSURE: 78 MMHG

## 2025-05-06 DIAGNOSIS — Z30.019 ENCOUNTER FOR INITIAL PRESCRIPTION OF CONTRACEPTIVES, UNSPECIFIED: ICD-10-CM

## 2025-05-06 PROCEDURE — 0503F POSTPARTUM CARE VISIT: CPT

## 2025-05-06 RX ORDER — MEDROXYPROGESTERONE ACETATE 150 MG/ML
150 INJECTION, SUSPENSION INTRAMUSCULAR
Qty: 1 | Refills: 4 | Status: ACTIVE | COMMUNITY
Start: 2025-05-06 | End: 1900-01-01

## 2025-06-06 ENCOUNTER — APPOINTMENT (OUTPATIENT)
Dept: OBGYN | Facility: CLINIC | Age: 25
End: 2025-06-06
Payer: COMMERCIAL

## 2025-06-06 VITALS
HEIGHT: 67 IN | SYSTOLIC BLOOD PRESSURE: 123 MMHG | WEIGHT: 293 LBS | BODY MASS INDEX: 45.99 KG/M2 | HEART RATE: 71 BPM | DIASTOLIC BLOOD PRESSURE: 86 MMHG

## 2025-06-06 PROCEDURE — 96372 THER/PROPH/DIAG INJ SC/IM: CPT

## 2025-06-06 PROCEDURE — 0503F POSTPARTUM CARE VISIT: CPT

## 2025-06-06 RX ORDER — MEDROXYPROGESTERONE ACETATE 150 MG/ML
150 INJECTION, SUSPENSION INTRAMUSCULAR
Refills: 0 | Status: COMPLETED | OUTPATIENT
Start: 2025-06-06

## 2025-06-06 RX ADMIN — MEDROXYPROGESTERONE ACETATE MG/ML: 150 INJECTION, SUSPENSION INTRAMUSCULAR at 00:00

## 2025-07-17 ENCOUNTER — NON-APPOINTMENT (OUTPATIENT)
Age: 25
End: 2025-07-17

## 2025-09-05 ENCOUNTER — APPOINTMENT (OUTPATIENT)
Dept: OBGYN | Facility: CLINIC | Age: 25
End: 2025-09-05

## 2025-09-05 DIAGNOSIS — Z30.42 ENCOUNTER FOR SURVEILLANCE OF INJECTABLE CONTRACEPTIVE: ICD-10-CM
